# Patient Record
Sex: FEMALE | Race: BLACK OR AFRICAN AMERICAN | NOT HISPANIC OR LATINO
[De-identification: names, ages, dates, MRNs, and addresses within clinical notes are randomized per-mention and may not be internally consistent; named-entity substitution may affect disease eponyms.]

---

## 2017-10-09 ENCOUNTER — RESULT REVIEW (OUTPATIENT)
Age: 32
End: 2017-10-09

## 2017-12-09 ENCOUNTER — TRANSCRIPTION ENCOUNTER (OUTPATIENT)
Age: 32
End: 2017-12-09

## 2017-12-09 ENCOUNTER — OUTPATIENT (OUTPATIENT)
Dept: OUTPATIENT SERVICES | Facility: HOSPITAL | Age: 32
LOS: 1 days | End: 2017-12-09
Payer: COMMERCIAL

## 2017-12-09 DIAGNOSIS — N88.3 INCOMPETENCE OF CERVIX UTERI: ICD-10-CM

## 2017-12-09 LAB
BASOPHILS # BLD AUTO: 0.1 K/UL — SIGNIFICANT CHANGE UP (ref 0–0.2)
BASOPHILS NFR BLD AUTO: 0.6 % — SIGNIFICANT CHANGE UP (ref 0–2)
BLD GP AB SCN SERPL QL: NEGATIVE — SIGNIFICANT CHANGE UP
EOSINOPHIL # BLD AUTO: 0.1 K/UL — SIGNIFICANT CHANGE UP (ref 0–0.5)
EOSINOPHIL NFR BLD AUTO: 0.8 % — SIGNIFICANT CHANGE UP (ref 0–6)
HCT VFR BLD CALC: 34.1 % — LOW (ref 34.5–45)
HGB BLD-MCNC: 11.7 G/DL — SIGNIFICANT CHANGE UP (ref 11.5–15.5)
LYMPHOCYTES # BLD AUTO: 1.9 K/UL — SIGNIFICANT CHANGE UP (ref 1–3.3)
LYMPHOCYTES # BLD AUTO: 20.7 % — SIGNIFICANT CHANGE UP (ref 13–44)
MCHC RBC-ENTMCNC: 28.5 PG — SIGNIFICANT CHANGE UP (ref 27–34)
MCHC RBC-ENTMCNC: 34.4 GM/DL — SIGNIFICANT CHANGE UP (ref 32–36)
MCV RBC AUTO: 82.9 FL — SIGNIFICANT CHANGE UP (ref 80–100)
MONOCYTES # BLD AUTO: 0.6 K/UL — SIGNIFICANT CHANGE UP (ref 0–0.9)
MONOCYTES NFR BLD AUTO: 6 % — SIGNIFICANT CHANGE UP (ref 2–14)
NEUTROPHILS # BLD AUTO: 6.7 K/UL — SIGNIFICANT CHANGE UP (ref 1.8–7.4)
NEUTROPHILS NFR BLD AUTO: 71.9 % — SIGNIFICANT CHANGE UP (ref 43–77)
PLATELET # BLD AUTO: 287 K/UL — SIGNIFICANT CHANGE UP (ref 150–400)
RBC # BLD: 4.12 M/UL — SIGNIFICANT CHANGE UP (ref 3.8–5.2)
RBC # FLD: 11.9 % — SIGNIFICANT CHANGE UP (ref 10.3–14.5)
RH IG SCN BLD-IMP: POSITIVE — SIGNIFICANT CHANGE UP
RH IG SCN BLD-IMP: POSITIVE — SIGNIFICANT CHANGE UP
T PALLIDUM AB TITR SER: NEGATIVE — SIGNIFICANT CHANGE UP
WBC # BLD: 9.3 K/UL — SIGNIFICANT CHANGE UP (ref 3.8–10.5)
WBC # FLD AUTO: 9.3 K/UL — SIGNIFICANT CHANGE UP (ref 3.8–10.5)

## 2017-12-09 PROCEDURE — 86900 BLOOD TYPING SEROLOGIC ABO: CPT

## 2017-12-09 PROCEDURE — 86780 TREPONEMA PALLIDUM: CPT

## 2017-12-09 PROCEDURE — 59320 REVISION OF CERVIX: CPT

## 2017-12-09 PROCEDURE — 86901 BLOOD TYPING SEROLOGIC RH(D): CPT

## 2017-12-09 PROCEDURE — 86850 RBC ANTIBODY SCREEN: CPT

## 2017-12-09 PROCEDURE — 85027 COMPLETE CBC AUTOMATED: CPT

## 2017-12-09 RX ORDER — INDOMETHACIN 50 MG
50 CAPSULE ORAL ONCE
Qty: 0 | Refills: 0 | Status: COMPLETED | OUTPATIENT
Start: 2017-12-09 | End: 2017-12-09

## 2017-12-09 RX ORDER — CITRIC ACID/SODIUM CITRATE 300-500 MG
15 SOLUTION, ORAL ORAL ONCE
Qty: 0 | Refills: 0 | Status: COMPLETED | OUTPATIENT
Start: 2017-12-09 | End: 2017-12-09

## 2017-12-09 RX ORDER — SODIUM CHLORIDE 9 MG/ML
1000 INJECTION, SOLUTION INTRAVENOUS
Qty: 0 | Refills: 0 | Status: DISCONTINUED | OUTPATIENT
Start: 2017-12-09 | End: 2017-12-24

## 2017-12-09 RX ORDER — FAMOTIDINE 10 MG/ML
20 INJECTION INTRAVENOUS ONCE
Qty: 0 | Refills: 0 | Status: COMPLETED | OUTPATIENT
Start: 2017-12-09 | End: 2017-12-09

## 2017-12-09 RX ORDER — INDOMETHACIN 50 MG
25 CAPSULE ORAL ONCE
Qty: 0 | Refills: 0 | Status: DISCONTINUED | OUTPATIENT
Start: 2017-12-09 | End: 2017-12-09

## 2017-12-09 RX ORDER — ACETAMINOPHEN 500 MG
975 TABLET ORAL ONCE
Qty: 0 | Refills: 0 | Status: COMPLETED | OUTPATIENT
Start: 2017-12-09 | End: 2017-12-09

## 2017-12-09 RX ADMIN — FAMOTIDINE 20 MILLIGRAM(S): 10 INJECTION INTRAVENOUS at 09:40

## 2017-12-09 RX ADMIN — Medication 975 MILLIGRAM(S): at 14:30

## 2017-12-09 RX ADMIN — Medication 50 MILLIGRAM(S): at 16:11

## 2017-12-09 RX ADMIN — SODIUM CHLORIDE 125 MILLILITER(S): 9 INJECTION, SOLUTION INTRAVENOUS at 09:13

## 2017-12-09 RX ADMIN — Medication 50 MILLIGRAM(S): at 10:00

## 2017-12-09 RX ADMIN — Medication 975 MILLIGRAM(S): at 14:00

## 2017-12-09 RX ADMIN — Medication 15 MILLILITER(S): at 09:40

## 2018-01-17 ENCOUNTER — APPOINTMENT (OUTPATIENT)
Dept: ANTEPARTUM | Facility: CLINIC | Age: 33
End: 2018-01-17
Payer: COMMERCIAL

## 2018-01-17 ENCOUNTER — ASOB RESULT (OUTPATIENT)
Age: 33
End: 2018-01-17

## 2018-01-17 PROBLEM — Z00.00 ENCOUNTER FOR PREVENTIVE HEALTH EXAMINATION: Status: ACTIVE | Noted: 2018-01-17

## 2018-01-17 PROCEDURE — 76817 TRANSVAGINAL US OBSTETRIC: CPT

## 2018-01-17 PROCEDURE — 76811 OB US DETAILED SNGL FETUS: CPT

## 2018-01-28 ENCOUNTER — OUTPATIENT (OUTPATIENT)
Dept: OUTPATIENT SERVICES | Facility: HOSPITAL | Age: 33
LOS: 1 days | End: 2018-01-28
Payer: COMMERCIAL

## 2018-01-28 DIAGNOSIS — O26.899 OTHER SPECIFIED PREGNANCY RELATED CONDITIONS, UNSPECIFIED TRIMESTER: ICD-10-CM

## 2018-01-28 DIAGNOSIS — Z3A.00 WEEKS OF GESTATION OF PREGNANCY NOT SPECIFIED: ICD-10-CM

## 2018-01-28 LAB
APPEARANCE UR: ABNORMAL
BILIRUB UR-MCNC: NEGATIVE — SIGNIFICANT CHANGE UP
COLOR SPEC: SIGNIFICANT CHANGE UP
DIFF PNL FLD: NEGATIVE — SIGNIFICANT CHANGE UP
EPI CELLS # UR: ABNORMAL /HPF
GLUCOSE UR QL: NEGATIVE — SIGNIFICANT CHANGE UP
KETONES UR-MCNC: NEGATIVE — SIGNIFICANT CHANGE UP
LEUKOCYTE ESTERASE UR-ACNC: ABNORMAL
NITRITE UR-MCNC: NEGATIVE — SIGNIFICANT CHANGE UP
PH UR: 7 — SIGNIFICANT CHANGE UP (ref 5–8)
PROT UR-MCNC: NEGATIVE — SIGNIFICANT CHANGE UP
RBC CASTS # UR COMP ASSIST: SIGNIFICANT CHANGE UP /HPF (ref 0–2)
SP GR SPEC: 1.01 — SIGNIFICANT CHANGE UP (ref 1.01–1.02)
UROBILINOGEN FLD QL: NEGATIVE — SIGNIFICANT CHANGE UP
WBC UR QL: SIGNIFICANT CHANGE UP /HPF (ref 0–5)

## 2018-01-28 PROCEDURE — 87086 URINE CULTURE/COLONY COUNT: CPT

## 2018-01-28 PROCEDURE — G0463: CPT

## 2018-01-28 PROCEDURE — 81001 URINALYSIS AUTO W/SCOPE: CPT

## 2018-01-28 RX ORDER — NITROFURANTOIN MACROCRYSTAL 50 MG
1 CAPSULE ORAL
Qty: 20 | Refills: 0
Start: 2018-01-28 | End: 2018-02-06

## 2018-01-28 RX ORDER — NITROFURANTOIN MACROCRYSTAL 50 MG
100 CAPSULE ORAL
Qty: 0 | Refills: 0 | Status: DISCONTINUED | OUTPATIENT
Start: 2018-01-28 | End: 2018-02-12

## 2018-01-28 RX ADMIN — Medication 100 MILLIGRAM(S): at 09:45

## 2018-01-29 LAB
CULTURE RESULTS: SIGNIFICANT CHANGE UP
SPECIMEN SOURCE: SIGNIFICANT CHANGE UP

## 2018-02-01 ENCOUNTER — OUTPATIENT (OUTPATIENT)
Dept: OUTPATIENT SERVICES | Facility: HOSPITAL | Age: 33
LOS: 1 days | End: 2018-02-01
Payer: COMMERCIAL

## 2018-02-01 ENCOUNTER — APPOINTMENT (OUTPATIENT)
Dept: ANTEPARTUM | Facility: CLINIC | Age: 33
End: 2018-02-01
Payer: COMMERCIAL

## 2018-02-01 DIAGNOSIS — Z3A.00 WEEKS OF GESTATION OF PREGNANCY NOT SPECIFIED: ICD-10-CM

## 2018-02-01 DIAGNOSIS — O26.899 OTHER SPECIFIED PREGNANCY RELATED CONDITIONS, UNSPECIFIED TRIMESTER: ICD-10-CM

## 2018-02-01 PROCEDURE — 76815 OB US LIMITED FETUS(S): CPT

## 2018-02-01 PROCEDURE — G0463: CPT

## 2018-02-01 PROCEDURE — 59025 FETAL NON-STRESS TEST: CPT

## 2018-02-01 PROCEDURE — 76817 TRANSVAGINAL US OBSTETRIC: CPT

## 2018-02-01 RX ORDER — PROGESTERONE 200 MG/1
200 CAPSULE, LIQUID FILLED ORAL ONCE
Qty: 0 | Refills: 0 | Status: COMPLETED | OUTPATIENT
Start: 2018-02-01 | End: 2018-02-01

## 2018-02-01 RX ORDER — INDOMETHACIN 50 MG
50 CAPSULE ORAL ONCE
Qty: 0 | Refills: 0 | Status: COMPLETED | OUTPATIENT
Start: 2018-02-01 | End: 2018-02-01

## 2018-02-01 RX ADMIN — PROGESTERONE 200 MILLIGRAM(S): 200 CAPSULE, LIQUID FILLED ORAL at 21:10

## 2018-02-01 RX ADMIN — Medication 50 MILLIGRAM(S): at 20:10

## 2018-02-08 ENCOUNTER — APPOINTMENT (OUTPATIENT)
Dept: ANTEPARTUM | Facility: CLINIC | Age: 33
End: 2018-02-08
Payer: COMMERCIAL

## 2018-02-08 ENCOUNTER — ASOB RESULT (OUTPATIENT)
Age: 33
End: 2018-02-08

## 2018-02-08 PROCEDURE — 76817 TRANSVAGINAL US OBSTETRIC: CPT

## 2018-02-08 PROCEDURE — 99213 OFFICE O/P EST LOW 20 MIN: CPT | Mod: 25

## 2018-02-08 PROCEDURE — 76815 OB US LIMITED FETUS(S): CPT

## 2018-02-22 ENCOUNTER — APPOINTMENT (OUTPATIENT)
Dept: ANTEPARTUM | Facility: CLINIC | Age: 33
End: 2018-02-22
Payer: COMMERCIAL

## 2018-02-22 ENCOUNTER — ASOB RESULT (OUTPATIENT)
Age: 33
End: 2018-02-22

## 2018-02-22 PROCEDURE — 76816 OB US FOLLOW-UP PER FETUS: CPT

## 2018-02-22 PROCEDURE — 76817 TRANSVAGINAL US OBSTETRIC: CPT

## 2018-03-07 ENCOUNTER — ASOB RESULT (OUTPATIENT)
Age: 33
End: 2018-03-07

## 2018-03-07 ENCOUNTER — APPOINTMENT (OUTPATIENT)
Dept: ANTEPARTUM | Facility: CLINIC | Age: 33
End: 2018-03-07
Payer: COMMERCIAL

## 2018-03-07 PROCEDURE — 76815 OB US LIMITED FETUS(S): CPT

## 2018-03-07 PROCEDURE — 76817 TRANSVAGINAL US OBSTETRIC: CPT

## 2018-03-19 ENCOUNTER — APPOINTMENT (OUTPATIENT)
Dept: ANTEPARTUM | Facility: CLINIC | Age: 33
End: 2018-03-19
Payer: COMMERCIAL

## 2018-03-19 ENCOUNTER — ASOB RESULT (OUTPATIENT)
Age: 33
End: 2018-03-19

## 2018-03-19 ENCOUNTER — OUTPATIENT (OUTPATIENT)
Dept: OUTPATIENT SERVICES | Facility: HOSPITAL | Age: 33
LOS: 1 days | End: 2018-03-19
Payer: COMMERCIAL

## 2018-03-19 DIAGNOSIS — O26.899 OTHER SPECIFIED PREGNANCY RELATED CONDITIONS, UNSPECIFIED TRIMESTER: ICD-10-CM

## 2018-03-19 DIAGNOSIS — Z3A.00 WEEKS OF GESTATION OF PREGNANCY NOT SPECIFIED: ICD-10-CM

## 2018-03-19 LAB
APPEARANCE UR: CLEAR — SIGNIFICANT CHANGE UP
BILIRUB UR-MCNC: NEGATIVE — SIGNIFICANT CHANGE UP
COLOR SPEC: COLORLESS — SIGNIFICANT CHANGE UP
DIFF PNL FLD: NEGATIVE — SIGNIFICANT CHANGE UP
EPI CELLS # UR: SIGNIFICANT CHANGE UP /HPF
GLUCOSE UR QL: NEGATIVE — SIGNIFICANT CHANGE UP
KETONES UR-MCNC: NEGATIVE — SIGNIFICANT CHANGE UP
LEUKOCYTE ESTERASE UR-ACNC: NEGATIVE — SIGNIFICANT CHANGE UP
NITRITE UR-MCNC: NEGATIVE — SIGNIFICANT CHANGE UP
PH UR: 6 — SIGNIFICANT CHANGE UP (ref 5–8)
PROT UR-MCNC: NEGATIVE — SIGNIFICANT CHANGE UP
RBC CASTS # UR COMP ASSIST: SIGNIFICANT CHANGE UP /HPF (ref 0–2)
SP GR SPEC: 1 — LOW (ref 1.01–1.02)
UROBILINOGEN FLD QL: NEGATIVE — SIGNIFICANT CHANGE UP
WBC UR QL: SIGNIFICANT CHANGE UP /HPF (ref 0–5)

## 2018-03-19 PROCEDURE — 76817 TRANSVAGINAL US OBSTETRIC: CPT

## 2018-03-19 PROCEDURE — 81001 URINALYSIS AUTO W/SCOPE: CPT

## 2018-03-19 PROCEDURE — 76816 OB US FOLLOW-UP PER FETUS: CPT | Mod: 26

## 2018-03-19 PROCEDURE — 76817 TRANSVAGINAL US OBSTETRIC: CPT | Mod: 26

## 2018-03-19 PROCEDURE — 59025 FETAL NON-STRESS TEST: CPT

## 2018-03-19 PROCEDURE — 76819 FETAL BIOPHYS PROFIL W/O NST: CPT | Mod: 26

## 2018-03-19 PROCEDURE — G0463: CPT

## 2018-03-19 PROCEDURE — 76819 FETAL BIOPHYS PROFIL W/O NST: CPT

## 2018-03-19 PROCEDURE — 76816 OB US FOLLOW-UP PER FETUS: CPT

## 2018-03-19 PROCEDURE — 87086 URINE CULTURE/COLONY COUNT: CPT

## 2018-03-19 RX ADMIN — Medication 12 MILLIGRAM(S): at 18:05

## 2018-03-20 ENCOUNTER — OUTPATIENT (OUTPATIENT)
Dept: OUTPATIENT SERVICES | Facility: HOSPITAL | Age: 33
LOS: 1 days | End: 2018-03-20
Payer: COMMERCIAL

## 2018-03-20 DIAGNOSIS — O26.899 OTHER SPECIFIED PREGNANCY RELATED CONDITIONS, UNSPECIFIED TRIMESTER: ICD-10-CM

## 2018-03-20 DIAGNOSIS — Z3A.00 WEEKS OF GESTATION OF PREGNANCY NOT SPECIFIED: ICD-10-CM

## 2018-03-20 LAB
CULTURE RESULTS: SIGNIFICANT CHANGE UP
SPECIMEN SOURCE: SIGNIFICANT CHANGE UP

## 2018-03-20 PROCEDURE — 59025 FETAL NON-STRESS TEST: CPT

## 2018-03-20 PROCEDURE — G0463: CPT

## 2018-03-20 RX ADMIN — Medication 12 MILLIGRAM(S): at 19:24

## 2018-03-21 ENCOUNTER — APPOINTMENT (OUTPATIENT)
Dept: ANTEPARTUM | Facility: CLINIC | Age: 33
End: 2018-03-21

## 2018-05-15 ENCOUNTER — TRANSCRIPTION ENCOUNTER (OUTPATIENT)
Age: 33
End: 2018-05-15

## 2018-05-16 ENCOUNTER — OUTPATIENT (OUTPATIENT)
Dept: OUTPATIENT SERVICES | Facility: HOSPITAL | Age: 33
LOS: 1 days | End: 2018-05-16
Payer: COMMERCIAL

## 2018-05-16 DIAGNOSIS — O34.31 MATERNAL CARE FOR CERVICAL INCOMPETENCE, FIRST TRIMESTER: ICD-10-CM

## 2018-05-16 DIAGNOSIS — O26.899 OTHER SPECIFIED PREGNANCY RELATED CONDITIONS, UNSPECIFIED TRIMESTER: ICD-10-CM

## 2018-05-16 DIAGNOSIS — Z3A.00 WEEKS OF GESTATION OF PREGNANCY NOT SPECIFIED: ICD-10-CM

## 2018-05-16 LAB
BASOPHILS # BLD AUTO: 0 K/UL — SIGNIFICANT CHANGE UP (ref 0–0.2)
BASOPHILS NFR BLD AUTO: 0.2 % — SIGNIFICANT CHANGE UP (ref 0–2)
BLD GP AB SCN SERPL QL: NEGATIVE — SIGNIFICANT CHANGE UP
EOSINOPHIL # BLD AUTO: 0.1 K/UL — SIGNIFICANT CHANGE UP (ref 0–0.5)
EOSINOPHIL NFR BLD AUTO: 0.6 % — SIGNIFICANT CHANGE UP (ref 0–6)
HCT VFR BLD CALC: 36.6 % — SIGNIFICANT CHANGE UP (ref 34.5–45)
HGB BLD-MCNC: 12.2 G/DL — SIGNIFICANT CHANGE UP (ref 11.5–15.5)
LYMPHOCYTES # BLD AUTO: 1.9 K/UL — SIGNIFICANT CHANGE UP (ref 1–3.3)
LYMPHOCYTES # BLD AUTO: 15.7 % — SIGNIFICANT CHANGE UP (ref 13–44)
MCHC RBC-ENTMCNC: 27.2 PG — SIGNIFICANT CHANGE UP (ref 27–34)
MCHC RBC-ENTMCNC: 33.2 GM/DL — SIGNIFICANT CHANGE UP (ref 32–36)
MCV RBC AUTO: 82 FL — SIGNIFICANT CHANGE UP (ref 80–100)
MONOCYTES # BLD AUTO: 0.9 K/UL — SIGNIFICANT CHANGE UP (ref 0–0.9)
MONOCYTES NFR BLD AUTO: 7.7 % — SIGNIFICANT CHANGE UP (ref 2–14)
NEUTROPHILS # BLD AUTO: 9.1 K/UL — HIGH (ref 1.8–7.4)
NEUTROPHILS NFR BLD AUTO: 75.7 % — SIGNIFICANT CHANGE UP (ref 43–77)
PLATELET # BLD AUTO: 274 K/UL — SIGNIFICANT CHANGE UP (ref 150–400)
RBC # BLD: 4.47 M/UL — SIGNIFICANT CHANGE UP (ref 3.8–5.2)
RBC # FLD: 14.9 % — HIGH (ref 10.3–14.5)
RH IG SCN BLD-IMP: POSITIVE — SIGNIFICANT CHANGE UP
WBC # BLD: 12 K/UL — HIGH (ref 3.8–10.5)
WBC # FLD AUTO: 12 K/UL — HIGH (ref 3.8–10.5)

## 2018-05-16 PROCEDURE — 59871 REMOVE CERCLAGE SUTURE: CPT

## 2018-05-16 PROCEDURE — 86900 BLOOD TYPING SEROLOGIC ABO: CPT

## 2018-05-16 PROCEDURE — 86850 RBC ANTIBODY SCREEN: CPT

## 2018-05-16 PROCEDURE — 59025 FETAL NON-STRESS TEST: CPT

## 2018-05-16 PROCEDURE — 85027 COMPLETE CBC AUTOMATED: CPT

## 2018-05-16 PROCEDURE — G0463: CPT

## 2018-05-16 PROCEDURE — 86901 BLOOD TYPING SEROLOGIC RH(D): CPT

## 2018-05-16 RX ORDER — SODIUM CHLORIDE 9 MG/ML
500 INJECTION, SOLUTION INTRAVENOUS ONCE
Qty: 0 | Refills: 0 | Status: DISCONTINUED | OUTPATIENT
Start: 2018-05-16 | End: 2018-05-31

## 2018-05-16 RX ORDER — SODIUM CHLORIDE 9 MG/ML
1000 INJECTION, SOLUTION INTRAVENOUS
Qty: 0 | Refills: 0 | Status: DISCONTINUED | OUTPATIENT
Start: 2018-05-16 | End: 2018-05-31

## 2018-05-25 ENCOUNTER — INPATIENT (INPATIENT)
Facility: HOSPITAL | Age: 33
LOS: 1 days | Discharge: HOME | End: 2018-05-27
Attending: OBSTETRICS & GYNECOLOGY | Admitting: OBSTETRICS & GYNECOLOGY
Payer: COMMERCIAL

## 2018-05-25 DIAGNOSIS — Z3A.00 WEEKS OF GESTATION OF PREGNANCY NOT SPECIFIED: ICD-10-CM

## 2018-05-25 DIAGNOSIS — O26.899 OTHER SPECIFIED PREGNANCY RELATED CONDITIONS, UNSPECIFIED TRIMESTER: ICD-10-CM

## 2018-05-25 DIAGNOSIS — Z34.80 ENCOUNTER FOR SUPERVISION OF OTHER NORMAL PREGNANCY, UNSPECIFIED TRIMESTER: ICD-10-CM

## 2018-05-25 LAB
ALBUMIN SERPL ELPH-MCNC: 3.7 G/DL — SIGNIFICANT CHANGE UP (ref 3.3–5)
ALP SERPL-CCNC: 93 U/L — SIGNIFICANT CHANGE UP (ref 40–120)
ALT FLD-CCNC: 20 U/L — SIGNIFICANT CHANGE UP (ref 10–45)
ANION GAP SERPL CALC-SCNC: 14 MMOL/L — SIGNIFICANT CHANGE UP (ref 5–17)
APTT BLD: 28.8 SEC — SIGNIFICANT CHANGE UP (ref 27.5–37.4)
AST SERPL-CCNC: 22 U/L — SIGNIFICANT CHANGE UP (ref 10–40)
BASOPHILS # BLD AUTO: 0 K/UL — SIGNIFICANT CHANGE UP (ref 0–0.2)
BASOPHILS NFR BLD AUTO: 0.4 % — SIGNIFICANT CHANGE UP (ref 0–2)
BILIRUB SERPL-MCNC: 0.2 MG/DL — SIGNIFICANT CHANGE UP (ref 0.2–1.2)
BUN SERPL-MCNC: 13 MG/DL — SIGNIFICANT CHANGE UP (ref 7–23)
CALCIUM SERPL-MCNC: 9.8 MG/DL — SIGNIFICANT CHANGE UP (ref 8.4–10.5)
CHLORIDE SERPL-SCNC: 99 MMOL/L — SIGNIFICANT CHANGE UP (ref 96–108)
CO2 SERPL-SCNC: 23 MMOL/L — SIGNIFICANT CHANGE UP (ref 22–31)
CREAT SERPL-MCNC: 0.95 MG/DL — SIGNIFICANT CHANGE UP (ref 0.5–1.3)
EOSINOPHIL # BLD AUTO: 0.1 K/UL — SIGNIFICANT CHANGE UP (ref 0–0.5)
EOSINOPHIL NFR BLD AUTO: 0.9 % — SIGNIFICANT CHANGE UP (ref 0–6)
FIBRINOGEN PPP-MCNC: 882 MG/DL — HIGH (ref 310–510)
GLUCOSE SERPL-MCNC: 85 MG/DL — SIGNIFICANT CHANGE UP (ref 70–99)
HCT VFR BLD CALC: 38.6 % — SIGNIFICANT CHANGE UP (ref 34.5–45)
HGB BLD-MCNC: 12.1 G/DL — SIGNIFICANT CHANGE UP (ref 11.5–15.5)
INR BLD: 0.97 RATIO — SIGNIFICANT CHANGE UP (ref 0.88–1.16)
LDH SERPL L TO P-CCNC: 174 U/L — SIGNIFICANT CHANGE UP (ref 50–242)
LYMPHOCYTES # BLD AUTO: 19.2 % — SIGNIFICANT CHANGE UP (ref 13–44)
LYMPHOCYTES # BLD AUTO: 2.3 K/UL — SIGNIFICANT CHANGE UP (ref 1–3.3)
MCHC RBC-ENTMCNC: 25.5 PG — LOW (ref 27–34)
MCHC RBC-ENTMCNC: 31.3 GM/DL — LOW (ref 32–36)
MCV RBC AUTO: 81.4 FL — SIGNIFICANT CHANGE UP (ref 80–100)
MONOCYTES # BLD AUTO: 1 K/UL — HIGH (ref 0–0.9)
MONOCYTES NFR BLD AUTO: 8.2 % — SIGNIFICANT CHANGE UP (ref 2–14)
NEUTROPHILS # BLD AUTO: 8.7 K/UL — HIGH (ref 1.8–7.4)
NEUTROPHILS NFR BLD AUTO: 71.3 % — SIGNIFICANT CHANGE UP (ref 43–77)
PLATELET # BLD AUTO: 301 K/UL — SIGNIFICANT CHANGE UP (ref 150–400)
POTASSIUM SERPL-MCNC: 3.8 MMOL/L — SIGNIFICANT CHANGE UP (ref 3.5–5.3)
POTASSIUM SERPL-SCNC: 3.8 MMOL/L — SIGNIFICANT CHANGE UP (ref 3.5–5.3)
PROT SERPL-MCNC: 7.4 G/DL — SIGNIFICANT CHANGE UP (ref 6–8.3)
PROTHROM AB SERPL-ACNC: 10.5 SEC — SIGNIFICANT CHANGE UP (ref 9.8–12.7)
RBC # BLD: 4.75 M/UL — SIGNIFICANT CHANGE UP (ref 3.8–5.2)
RBC # FLD: 15.4 % — HIGH (ref 10.3–14.5)
SODIUM SERPL-SCNC: 136 MMOL/L — SIGNIFICANT CHANGE UP (ref 135–145)
URATE SERPL-MCNC: 5.1 MG/DL — SIGNIFICANT CHANGE UP (ref 2.5–7)
WBC # BLD: 12.2 K/UL — HIGH (ref 3.8–10.5)
WBC # FLD AUTO: 12.2 K/UL — HIGH (ref 3.8–10.5)

## 2018-05-25 RX ORDER — CITRIC ACID/SODIUM CITRATE 300-500 MG
15 SOLUTION, ORAL ORAL EVERY 4 HOURS
Qty: 0 | Refills: 0 | Status: DISCONTINUED | OUTPATIENT
Start: 2018-05-25 | End: 2018-05-26

## 2018-05-25 RX ORDER — AMPICILLIN TRIHYDRATE 250 MG
1 CAPSULE ORAL EVERY 4 HOURS
Qty: 0 | Refills: 0 | Status: DISCONTINUED | OUTPATIENT
Start: 2018-05-26 | End: 2018-05-27

## 2018-05-25 RX ORDER — SODIUM CHLORIDE 9 MG/ML
1000 INJECTION, SOLUTION INTRAVENOUS ONCE
Qty: 0 | Refills: 0 | Status: COMPLETED | OUTPATIENT
Start: 2018-05-25 | End: 2018-05-25

## 2018-05-25 RX ORDER — OXYTOCIN 10 UNIT/ML
333.33 VIAL (ML) INJECTION
Qty: 20 | Refills: 0 | Status: DISCONTINUED | OUTPATIENT
Start: 2018-05-25 | End: 2018-05-26

## 2018-05-25 RX ORDER — AMPICILLIN TRIHYDRATE 250 MG
2 CAPSULE ORAL ONCE
Qty: 0 | Refills: 0 | Status: COMPLETED | OUTPATIENT
Start: 2018-05-25 | End: 2018-05-25

## 2018-05-25 RX ORDER — AMPICILLIN TRIHYDRATE 250 MG
CAPSULE ORAL
Qty: 0 | Refills: 0 | Status: DISCONTINUED | OUTPATIENT
Start: 2018-05-25 | End: 2018-05-27

## 2018-05-25 RX ORDER — SODIUM CHLORIDE 9 MG/ML
1000 INJECTION, SOLUTION INTRAVENOUS
Qty: 0 | Refills: 0 | Status: DISCONTINUED | OUTPATIENT
Start: 2018-05-25 | End: 2018-05-26

## 2018-05-25 RX ADMIN — Medication 216 GRAM(S): at 22:27

## 2018-05-25 RX ADMIN — SODIUM CHLORIDE 2000 MILLILITER(S): 9 INJECTION, SOLUTION INTRAVENOUS at 22:27

## 2018-05-25 RX ADMIN — SODIUM CHLORIDE 250 MILLILITER(S): 9 INJECTION, SOLUTION INTRAVENOUS at 22:27

## 2018-05-26 VITALS
OXYGEN SATURATION: 100 % | TEMPERATURE: 98 F | RESPIRATION RATE: 20 BRPM | SYSTOLIC BLOOD PRESSURE: 145 MMHG | HEART RATE: 113 BPM | DIASTOLIC BLOOD PRESSURE: 86 MMHG

## 2018-05-26 LAB
APPEARANCE UR: ABNORMAL
BILIRUB UR-MCNC: NEGATIVE — SIGNIFICANT CHANGE UP
BLD GP AB SCN SERPL QL: NEGATIVE — SIGNIFICANT CHANGE UP
COLOR SPEC: YELLOW — SIGNIFICANT CHANGE UP
COMMENT - URINE: SIGNIFICANT CHANGE UP
CREAT ?TM UR-MCNC: 69 MG/DL — SIGNIFICANT CHANGE UP
DIFF PNL FLD: ABNORMAL
EPI CELLS # UR: SIGNIFICANT CHANGE UP /HPF
GLUCOSE UR QL: 250 MG/DL
HCT VFR BLD CALC: 34.4 % — LOW (ref 34.5–45)
HGB BLD-MCNC: 11.2 G/DL — LOW (ref 11.5–15.5)
KETONES UR-MCNC: ABNORMAL
LEUKOCYTE ESTERASE UR-ACNC: ABNORMAL
NITRITE UR-MCNC: NEGATIVE — SIGNIFICANT CHANGE UP
PH UR: 6 — SIGNIFICANT CHANGE UP (ref 5–8)
PROT ?TM UR-MCNC: 50 MG/DL — HIGH (ref 0–12)
PROT UR-MCNC: 30 MG/DL
PROT/CREAT UR-RTO: 0.7 RATIO — HIGH (ref 0–0.2)
RBC CASTS # UR COMP ASSIST: >50 /HPF (ref 0–2)
RH IG SCN BLD-IMP: POSITIVE — SIGNIFICANT CHANGE UP
SP GR SPEC: 1.01 — SIGNIFICANT CHANGE UP (ref 1.01–1.02)
T PALLIDUM AB TITR SER: NEGATIVE — SIGNIFICANT CHANGE UP
UROBILINOGEN FLD QL: NEGATIVE — SIGNIFICANT CHANGE UP
WBC UR QL: ABNORMAL /HPF (ref 0–5)

## 2018-05-26 RX ORDER — ACETAMINOPHEN 500 MG
975 TABLET ORAL EVERY 6 HOURS
Qty: 0 | Refills: 0 | Status: DISCONTINUED | OUTPATIENT
Start: 2018-05-26 | End: 2018-05-27

## 2018-05-26 RX ORDER — AER TRAVELER 0.5 G/1
1 SOLUTION RECTAL; TOPICAL EVERY 4 HOURS
Qty: 0 | Refills: 0 | Status: DISCONTINUED | OUTPATIENT
Start: 2018-05-26 | End: 2018-05-27

## 2018-05-26 RX ORDER — GLYCERIN ADULT
1 SUPPOSITORY, RECTAL RECTAL AT BEDTIME
Qty: 0 | Refills: 0 | Status: DISCONTINUED | OUTPATIENT
Start: 2018-05-26 | End: 2018-05-27

## 2018-05-26 RX ORDER — TETANUS TOXOID, REDUCED DIPHTHERIA TOXOID AND ACELLULAR PERTUSSIS VACCINE, ADSORBED 5; 2.5; 8; 8; 2.5 [IU]/.5ML; [IU]/.5ML; UG/.5ML; UG/.5ML; UG/.5ML
0.5 SUSPENSION INTRAMUSCULAR ONCE
Qty: 0 | Refills: 0 | Status: DISCONTINUED | OUTPATIENT
Start: 2018-05-26 | End: 2018-05-27

## 2018-05-26 RX ORDER — INFLUENZA VIRUS VACCINE 15; 15; 15; 15 UG/.5ML; UG/.5ML; UG/.5ML; UG/.5ML
0.5 SUSPENSION INTRAMUSCULAR ONCE
Qty: 0 | Refills: 0 | Status: DISCONTINUED | OUTPATIENT
Start: 2018-05-26 | End: 2018-05-27

## 2018-05-26 RX ORDER — DIPHENHYDRAMINE HCL 50 MG
25 CAPSULE ORAL EVERY 6 HOURS
Qty: 0 | Refills: 0 | Status: DISCONTINUED | OUTPATIENT
Start: 2018-05-26 | End: 2018-05-27

## 2018-05-26 RX ORDER — HYDROCORTISONE 1 %
1 OINTMENT (GRAM) TOPICAL EVERY 4 HOURS
Qty: 0 | Refills: 0 | Status: DISCONTINUED | OUTPATIENT
Start: 2018-05-26 | End: 2018-05-27

## 2018-05-26 RX ORDER — KETOROLAC TROMETHAMINE 30 MG/ML
30 SYRINGE (ML) INJECTION ONCE
Qty: 0 | Refills: 0 | Status: DISCONTINUED | OUTPATIENT
Start: 2018-05-26 | End: 2018-05-26

## 2018-05-26 RX ORDER — ACETAMINOPHEN 500 MG
975 TABLET ORAL EVERY 6 HOURS
Qty: 0 | Refills: 0 | Status: COMPLETED | OUTPATIENT
Start: 2018-05-26 | End: 2019-04-24

## 2018-05-26 RX ORDER — OXYTOCIN 10 UNIT/ML
41.67 VIAL (ML) INJECTION
Qty: 20 | Refills: 0 | Status: DISCONTINUED | OUTPATIENT
Start: 2018-05-26 | End: 2018-05-27

## 2018-05-26 RX ORDER — IBUPROFEN 200 MG
600 TABLET ORAL EVERY 6 HOURS
Qty: 0 | Refills: 0 | Status: COMPLETED | OUTPATIENT
Start: 2018-05-26 | End: 2019-04-24

## 2018-05-26 RX ORDER — DIBUCAINE 1 %
1 OINTMENT (GRAM) RECTAL EVERY 4 HOURS
Qty: 0 | Refills: 0 | Status: DISCONTINUED | OUTPATIENT
Start: 2018-05-26 | End: 2018-05-27

## 2018-05-26 RX ORDER — PRAMOXINE HYDROCHLORIDE 150 MG/15G
1 AEROSOL, FOAM RECTAL EVERY 4 HOURS
Qty: 0 | Refills: 0 | Status: DISCONTINUED | OUTPATIENT
Start: 2018-05-26 | End: 2018-05-27

## 2018-05-26 RX ORDER — DOCUSATE SODIUM 100 MG
100 CAPSULE ORAL
Qty: 0 | Refills: 0 | Status: DISCONTINUED | OUTPATIENT
Start: 2018-05-26 | End: 2018-05-27

## 2018-05-26 RX ORDER — SIMETHICONE 80 MG/1
80 TABLET, CHEWABLE ORAL EVERY 6 HOURS
Qty: 0 | Refills: 0 | Status: DISCONTINUED | OUTPATIENT
Start: 2018-05-26 | End: 2018-05-27

## 2018-05-26 RX ORDER — SODIUM CHLORIDE 9 MG/ML
3 INJECTION INTRAMUSCULAR; INTRAVENOUS; SUBCUTANEOUS EVERY 8 HOURS
Qty: 0 | Refills: 0 | Status: DISCONTINUED | OUTPATIENT
Start: 2018-05-26 | End: 2018-05-27

## 2018-05-26 RX ORDER — OXYCODONE HYDROCHLORIDE 5 MG/1
5 TABLET ORAL
Qty: 0 | Refills: 0 | Status: DISCONTINUED | OUTPATIENT
Start: 2018-05-26 | End: 2018-05-27

## 2018-05-26 RX ORDER — IBUPROFEN 200 MG
600 TABLET ORAL EVERY 6 HOURS
Qty: 0 | Refills: 0 | Status: DISCONTINUED | OUTPATIENT
Start: 2018-05-26 | End: 2018-05-27

## 2018-05-26 RX ORDER — LANOLIN
1 OINTMENT (GRAM) TOPICAL EVERY 6 HOURS
Qty: 0 | Refills: 0 | Status: DISCONTINUED | OUTPATIENT
Start: 2018-05-26 | End: 2018-05-27

## 2018-05-26 RX ORDER — MAGNESIUM HYDROXIDE 400 MG/1
30 TABLET, CHEWABLE ORAL
Qty: 0 | Refills: 0 | Status: DISCONTINUED | OUTPATIENT
Start: 2018-05-26 | End: 2018-05-27

## 2018-05-26 RX ADMIN — Medication 600 MILLIGRAM(S): at 09:04

## 2018-05-26 RX ADMIN — Medication 30 MILLIGRAM(S): at 00:25

## 2018-05-26 RX ADMIN — Medication 600 MILLIGRAM(S): at 15:00

## 2018-05-26 RX ADMIN — Medication 1 TABLET(S): at 14:23

## 2018-05-26 RX ADMIN — Medication 975 MILLIGRAM(S): at 15:00

## 2018-05-26 RX ADMIN — Medication 600 MILLIGRAM(S): at 14:24

## 2018-05-26 RX ADMIN — Medication 600 MILLIGRAM(S): at 08:39

## 2018-05-26 RX ADMIN — Medication 975 MILLIGRAM(S): at 14:24

## 2018-05-26 NOTE — PROGRESS NOTE ADULT - SUBJECTIVE AND OBJECTIVE BOX
OB Attending Note    S: Patient doing well. Minimal lochia. Pain controlled.    O: Vital Signs Last 24 Hrs  T(C): 36.8 (26 May 2018 03:55), Max: 36.8 (26 May 2018 00:10)  T(F): 98.3 (26 May 2018 03:55), Max: 98.3 (26 May 2018 03:55)  HR: 103 (26 May 2018 03:55) (96 - 115)  BP: 124/81 (26 May 2018 03:55) (123/68 - 145/86)  BP(mean): --  RR: 18 (26 May 2018 03:55) (18 - 20)  SpO2: 99% (26 May 2018 02:10) (98% - 100%)    Gen: NAD  Abd: soft, NT, ND, fundus firm below umbilicus  Lochia: min  Perineum healing well  Ext: no tenderness    Labs:                        11.2   x     )-----------( x        ( 26 May 2018 06:11 )             34.4       A: 32y PPD#1 s/p  doing well.    Plan: cont PP care  OOB/ambulation  Pain control  Circ counseling done, risks and benefits reviewed, Risks include not limited to bleeding, infection, organ damage, permanent cosmetic changes and need for repeat circ - not cleared at this time

## 2018-05-26 NOTE — PROGRESS NOTE ADULT - SUBJECTIVE AND OBJECTIVE BOX
Postpartum Note- PPD#1    Prenatal Labs  Blood type: O Positive  Rubella IgG: Imm  RPR: Negative        S:Patient w/o complaints, pain is controlled.    Pt is OOB, tolerating PO, voiding. Lochia WNL.     O:  Vital Signs Last 24 Hrs  T(C): 36.8 (26 May 2018 03:55), Max: 36.8 (26 May 2018 00:10)  T(F): 98.3 (26 May 2018 03:55), Max: 98.3 (26 May 2018 03:55)  HR: 103 (26 May 2018 03:55) (96 - 115)  BP: 124/81 (26 May 2018 03:55) (123/68 - 145/86)  BP(mean): --  RR: 18 (26 May 2018 03:55) (18 - 20)  SpO2: 99% (26 May 2018 02:10) (98% - 100%)     Gen: NAD  Abdomen: Soft, nontender, non-distended, fundus firm.  Vaginal: Lochia WNL,    Ext:  Neg calf tenderness    LABS:    Hemoglobin: 11.2 g/dL (05-26 @ 06:11)  Hemoglobin: 12.1 g/dL (05-25 @ 23:13)      Hematocrit: 34.4 % (05-26 @ 06:11)  Hematocrit: 38.6 % (05-25 @ 23:13)

## 2018-05-26 NOTE — PROGRESS NOTE ADULT - ASSESSMENT
A/P:  32y [id number] # 1   S/P  with second degree laceration     Doing Well    PMHx:  Current Issues:

## 2018-05-27 ENCOUNTER — TRANSCRIPTION ENCOUNTER (OUTPATIENT)
Age: 33
End: 2018-05-27

## 2018-05-27 VITALS
HEART RATE: 92 BPM | DIASTOLIC BLOOD PRESSURE: 80 MMHG | TEMPERATURE: 99 F | RESPIRATION RATE: 18 BRPM | SYSTOLIC BLOOD PRESSURE: 123 MMHG

## 2018-05-27 PROCEDURE — 85018 HEMOGLOBIN: CPT

## 2018-05-27 PROCEDURE — 81001 URINALYSIS AUTO W/SCOPE: CPT

## 2018-05-27 PROCEDURE — 59050 FETAL MONITOR W/REPORT: CPT

## 2018-05-27 PROCEDURE — 85027 COMPLETE CBC AUTOMATED: CPT

## 2018-05-27 PROCEDURE — 86780 TREPONEMA PALLIDUM: CPT

## 2018-05-27 PROCEDURE — 84156 ASSAY OF PROTEIN URINE: CPT

## 2018-05-27 PROCEDURE — 80053 COMPREHEN METABOLIC PANEL: CPT

## 2018-05-27 PROCEDURE — 85610 PROTHROMBIN TIME: CPT

## 2018-05-27 PROCEDURE — 85014 HEMATOCRIT: CPT

## 2018-05-27 PROCEDURE — 85384 FIBRINOGEN ACTIVITY: CPT

## 2018-05-27 PROCEDURE — 84550 ASSAY OF BLOOD/URIC ACID: CPT

## 2018-05-27 PROCEDURE — 59025 FETAL NON-STRESS TEST: CPT

## 2018-05-27 PROCEDURE — G0463: CPT

## 2018-05-27 PROCEDURE — 83615 LACTATE (LD) (LDH) ENZYME: CPT

## 2018-05-27 PROCEDURE — 85730 THROMBOPLASTIN TIME PARTIAL: CPT

## 2018-05-27 RX ORDER — ACETAMINOPHEN 500 MG
3 TABLET ORAL
Qty: 0 | Refills: 0 | DISCHARGE
Start: 2018-05-27

## 2018-05-27 RX ORDER — IBUPROFEN 200 MG
1 TABLET ORAL
Qty: 0 | Refills: 0 | DISCHARGE
Start: 2018-05-27

## 2018-05-27 RX ADMIN — Medication 600 MILLIGRAM(S): at 05:33

## 2018-05-27 RX ADMIN — Medication 600 MILLIGRAM(S): at 20:40

## 2018-05-27 RX ADMIN — Medication 1 TABLET(S): at 20:41

## 2018-05-27 RX ADMIN — Medication 975 MILLIGRAM(S): at 20:40

## 2018-05-27 RX ADMIN — Medication 975 MILLIGRAM(S): at 08:15

## 2018-05-27 RX ADMIN — Medication 975 MILLIGRAM(S): at 07:43

## 2018-05-27 RX ADMIN — Medication 975 MILLIGRAM(S): at 21:15

## 2018-05-27 RX ADMIN — Medication 975 MILLIGRAM(S): at 13:59

## 2018-05-27 RX ADMIN — Medication 975 MILLIGRAM(S): at 14:30

## 2018-05-27 RX ADMIN — Medication 600 MILLIGRAM(S): at 14:30

## 2018-05-27 RX ADMIN — Medication 600 MILLIGRAM(S): at 13:58

## 2018-05-27 RX ADMIN — Medication 600 MILLIGRAM(S): at 21:15

## 2018-05-27 NOTE — DISCHARGE NOTE OB - HOSPITAL COURSE
Patient presented at term in labor. She had an epidural. She had a normal labor course and delivered a male infant vaginally. H/H 11.2/34.4

## 2018-05-27 NOTE — DISCHARGE NOTE OB - CARE PLAN
Principal Discharge DX:	Normal vaginal delivery  Goal:	establish breastfeeding  Assessment and plan of treatment:	PPD 2, s/p  stable for discharge

## 2018-05-27 NOTE — DISCHARGE NOTE OB - MEDICATION SUMMARY - MEDICATIONS TO TAKE
I will START or STAY ON the medications listed below when I get home from the hospital:    acetaminophen 325 mg oral tablet  -- 3 tab(s) by mouth every 6 hours  -- Indication: For for pain    ibuprofen 600 mg oral tablet  -- 1 tab(s) by mouth every 6 hours  -- Indication: For for pain    Prenatal Multivitamins with Folic Acid 1 mg oral tablet  -- 1 tab(s) by mouth once a day  -- Indication: For general wellness    Macrobid 100 mg oral capsule  -- 1 cap(s) by mouth 2 times a day MDD:2  -- Finish all this medication unless otherwise directed by prescriber.  May discolor urine or feces.  Take with food or milk.    -- Indication: For until prescription is completed as prescribed by ordering MD

## 2018-05-27 NOTE — PROGRESS NOTE ADULT - SUBJECTIVE AND OBJECTIVE BOX
PPD#2- ATTENDING NOTE    S: Patient doing well. Minimal lochia. Pain controlled.    O: Vital Signs Last 24 Hrs  T(C): 36.8 (27 May 2018 05:00), Max: 36.8 (27 May 2018 05:00)  T(F): 98.3 (27 May 2018 05:00), Max: 98.3 (27 May 2018 05:00)  HR: 88 (27 May 2018 05:00) (88 - 101)  BP: 126/84 (27 May 2018 05:00) (119/79 - 126/84)  BP(mean): --  RR: 18 (27 May 2018 05:00) (18 - 18)  SpO2: --    Gen: NAD  Abd: soft, NT, ND, fundus firm below umbilicus  Lochia: moderate  Ext: no tenderness, no hyper reflexia    Labs:                        11.2   x     )-----------( x        ( 26 May 2018 06:11 )             34.4       A:     Office [phone]  Dr. Keane

## 2018-05-27 NOTE — PROGRESS NOTE ADULT - PROBLEM SELECTOR PLAN 1
32y PPD#2 s/p  doing well.    Plan:  Analgesia prn  Regular diet  Discharge instruction given  F/U 6 weeks
Increase OOB  Regular diet  PO Pain protocol  AM H&H  Routine Postpartum Care

## 2018-05-27 NOTE — DISCHARGE NOTE OB - PATIENT PORTAL LINK FT
You can access the [facility] Patient Portal, offered by [facility], by registering with the following website: [email or web address]

## 2018-05-27 NOTE — DISCHARGE NOTE OB - CARE PROVIDER_API CALL
Bren Mccord), Obstetrics and Gynecology  [address] [facility]  [address] [place] [address]  Phone: [phone]  Fax: [phone]

## 2019-06-12 ENCOUNTER — RESULT REVIEW (OUTPATIENT)
Age: 34
End: 2019-06-12

## 2020-06-15 ENCOUNTER — RESULT REVIEW (OUTPATIENT)
Age: 35
End: 2020-06-15

## 2021-07-12 ENCOUNTER — APPOINTMENT (OUTPATIENT)
Dept: OBGYN | Facility: CLINIC | Age: 36
End: 2021-07-12

## 2021-07-13 ENCOUNTER — APPOINTMENT (OUTPATIENT)
Dept: OBGYN | Facility: CLINIC | Age: 36
End: 2021-07-13

## 2022-08-04 ENCOUNTER — RESULT REVIEW (OUTPATIENT)
Age: 37
End: 2022-08-04

## 2023-01-06 ENCOUNTER — OUTPATIENT (OUTPATIENT)
Dept: OUTPATIENT SERVICES | Facility: HOSPITAL | Age: 38
LOS: 1 days | End: 2023-01-06
Payer: COMMERCIAL

## 2023-01-06 VITALS
HEART RATE: 79 BPM | TEMPERATURE: 98 F | HEIGHT: 66.5 IN | SYSTOLIC BLOOD PRESSURE: 114 MMHG | OXYGEN SATURATION: 100 % | DIASTOLIC BLOOD PRESSURE: 77 MMHG | RESPIRATION RATE: 14 BRPM | WEIGHT: 197.09 LBS

## 2023-01-06 DIAGNOSIS — Z87.42 PERSONAL HISTORY OF OTHER DISEASES OF THE FEMALE GENITAL TRACT: ICD-10-CM

## 2023-01-06 DIAGNOSIS — Z90.49 ACQUIRED ABSENCE OF OTHER SPECIFIED PARTS OF DIGESTIVE TRACT: Chronic | ICD-10-CM

## 2023-01-06 DIAGNOSIS — Z98.890 OTHER SPECIFIED POSTPROCEDURAL STATES: Chronic | ICD-10-CM

## 2023-01-06 DIAGNOSIS — N88.3 INCOMPETENCE OF CERVIX UTERI: ICD-10-CM

## 2023-01-06 LAB
ANION GAP SERPL CALC-SCNC: 8 MMOL/L — SIGNIFICANT CHANGE UP (ref 5–17)
BUN SERPL-MCNC: 5 MG/DL — LOW (ref 7–23)
CALCIUM SERPL-MCNC: 9.4 MG/DL — SIGNIFICANT CHANGE UP (ref 8.4–10.5)
CHLORIDE SERPL-SCNC: 102 MMOL/L — SIGNIFICANT CHANGE UP (ref 96–108)
CO2 SERPL-SCNC: 24 MMOL/L — SIGNIFICANT CHANGE UP (ref 22–31)
CREAT SERPL-MCNC: 0.65 MG/DL — SIGNIFICANT CHANGE UP (ref 0.5–1.3)
EGFR: 118 ML/MIN/1.73M2 — SIGNIFICANT CHANGE UP
GLUCOSE SERPL-MCNC: 79 MG/DL — SIGNIFICANT CHANGE UP (ref 70–99)
HCT VFR BLD CALC: 33.4 % — LOW (ref 34.5–45)
HGB BLD-MCNC: 11.1 G/DL — LOW (ref 11.5–15.5)
MCHC RBC-ENTMCNC: 26.6 PG — LOW (ref 27–34)
MCHC RBC-ENTMCNC: 33.2 GM/DL — SIGNIFICANT CHANGE UP (ref 32–36)
MCV RBC AUTO: 79.9 FL — LOW (ref 80–100)
NRBC # BLD: 0 /100 WBCS — SIGNIFICANT CHANGE UP (ref 0–0)
PLATELET # BLD AUTO: 335 K/UL — SIGNIFICANT CHANGE UP (ref 150–400)
POTASSIUM SERPL-MCNC: 4.3 MMOL/L — SIGNIFICANT CHANGE UP (ref 3.5–5.3)
POTASSIUM SERPL-SCNC: 4.3 MMOL/L — SIGNIFICANT CHANGE UP (ref 3.5–5.3)
RBC # BLD: 4.18 M/UL — SIGNIFICANT CHANGE UP (ref 3.8–5.2)
RBC # FLD: 13.2 % — SIGNIFICANT CHANGE UP (ref 10.3–14.5)
SODIUM SERPL-SCNC: 134 MMOL/L — LOW (ref 135–145)
WBC # BLD: 8.2 K/UL — SIGNIFICANT CHANGE UP (ref 3.8–10.5)
WBC # FLD AUTO: 8.2 K/UL — SIGNIFICANT CHANGE UP (ref 3.8–10.5)

## 2023-01-06 PROCEDURE — 80048 BASIC METABOLIC PNL TOTAL CA: CPT

## 2023-01-06 PROCEDURE — G0463: CPT

## 2023-01-06 PROCEDURE — 85027 COMPLETE CBC AUTOMATED: CPT

## 2023-01-06 PROCEDURE — 36415 COLL VENOUS BLD VENIPUNCTURE: CPT

## 2023-01-06 RX ORDER — SODIUM CHLORIDE 9 MG/ML
3 INJECTION INTRAMUSCULAR; INTRAVENOUS; SUBCUTANEOUS EVERY 8 HOURS
Refills: 0 | Status: DISCONTINUED | OUTPATIENT
Start: 2023-01-18 | End: 2023-02-01

## 2023-01-06 RX ORDER — SODIUM CHLORIDE 9 MG/ML
1000 INJECTION, SOLUTION INTRAVENOUS
Refills: 0 | Status: DISCONTINUED | OUTPATIENT
Start: 2023-01-18 | End: 2023-02-01

## 2023-01-06 NOTE — H&P PST ADULT - HISTORY OF PRESENT ILLNESS
38 y/o F ====== presents at ===GA prior to cervical cerclage.      Denies Hx of Covid-19 Infx.   Covid swab on   36 y/o  F with PMHx of "irregular heart beat". Presents to PST at 10w 6d GA prior to cervical cerclage.  No complications with current pregnancy other than nausea.    Patient denies SOB, CP, palpitation, blood in the stool or urine, N/V/D/C, HA, dizziness, seizures.    Denies Hx of Covid-19 Infx.   Covid swab on 1/15/23

## 2023-01-06 NOTE — H&P PST ADULT - NSICDXPASTSURGICALHX_GEN_ALL_CORE_FT
PAST SURGICAL HISTORY:  H/O hernia repair     History of cervical cerclage     S/P cholecystectomy

## 2023-01-06 NOTE — H&P PST ADULT - NS ATTEND AMEND GEN_ALL_CORE FT
38 YO , 12W 2D FOR CERCLAGE. HAD 22-23 WEEKS 2 TO INC CVX. HAD CERCLAGE PLACED FOR #2. CERCLAGE REMOVED 36 WEEKS. HAD PRECIP DELIVERY 39W. FOR CERCLAGE TODAY.

## 2023-01-06 NOTE — H&P PST ADULT - PROBLEM SELECTOR PLAN 1
Procedure: Cervical cerclage 1/18/23  PST labs pending  AC: None  Medication changes:  Medical/cardiac evaluation pending  Covid swab  DASI score:  Mallampati score:  STOP-BANG score: Procedure: Cervical cerclage 1/18/23  PST labs pending  AC: None  Medication changes: None  Will request last cardiologist note with Echo and Holter monitor- patient with "irregular heart beat" never went for results of work up  Covid swab 1/15/23

## 2023-01-15 ENCOUNTER — OUTPATIENT (OUTPATIENT)
Dept: OUTPATIENT SERVICES | Facility: HOSPITAL | Age: 38
LOS: 1 days | End: 2023-01-15
Payer: COMMERCIAL

## 2023-01-15 DIAGNOSIS — Z11.52 ENCOUNTER FOR SCREENING FOR COVID-19: ICD-10-CM

## 2023-01-15 DIAGNOSIS — Z90.49 ACQUIRED ABSENCE OF OTHER SPECIFIED PARTS OF DIGESTIVE TRACT: Chronic | ICD-10-CM

## 2023-01-15 DIAGNOSIS — Z98.890 OTHER SPECIFIED POSTPROCEDURAL STATES: Chronic | ICD-10-CM

## 2023-01-15 LAB — SARS-COV-2 RNA SPEC QL NAA+PROBE: SIGNIFICANT CHANGE UP

## 2023-01-15 PROCEDURE — U0005: CPT

## 2023-01-15 PROCEDURE — C9803: CPT

## 2023-01-15 PROCEDURE — U0003: CPT

## 2023-01-17 ENCOUNTER — TRANSCRIPTION ENCOUNTER (OUTPATIENT)
Age: 38
End: 2023-01-17

## 2023-01-17 PROBLEM — Z86.79 PERSONAL HISTORY OF OTHER DISEASES OF THE CIRCULATORY SYSTEM: Chronic | Status: ACTIVE | Noted: 2023-01-06

## 2023-01-17 PROBLEM — J30.2 OTHER SEASONAL ALLERGIC RHINITIS: Chronic | Status: ACTIVE | Noted: 2023-01-06

## 2023-01-18 ENCOUNTER — TRANSCRIPTION ENCOUNTER (OUTPATIENT)
Age: 38
End: 2023-01-18

## 2023-01-18 ENCOUNTER — OUTPATIENT (OUTPATIENT)
Dept: OUTPATIENT SERVICES | Facility: HOSPITAL | Age: 38
LOS: 1 days | End: 2023-01-18
Payer: COMMERCIAL

## 2023-01-18 VITALS
RESPIRATION RATE: 16 BRPM | SYSTOLIC BLOOD PRESSURE: 147 MMHG | WEIGHT: 197.09 LBS | HEART RATE: 70 BPM | HEIGHT: 66.5 IN | OXYGEN SATURATION: 100 % | DIASTOLIC BLOOD PRESSURE: 85 MMHG | TEMPERATURE: 97 F

## 2023-01-18 VITALS
DIASTOLIC BLOOD PRESSURE: 62 MMHG | HEART RATE: 75 BPM | OXYGEN SATURATION: 100 % | RESPIRATION RATE: 19 BRPM | SYSTOLIC BLOOD PRESSURE: 126 MMHG

## 2023-01-18 DIAGNOSIS — Z98.890 OTHER SPECIFIED POSTPROCEDURAL STATES: Chronic | ICD-10-CM

## 2023-01-18 DIAGNOSIS — N88.3 INCOMPETENCE OF CERVIX UTERI: ICD-10-CM

## 2023-01-18 DIAGNOSIS — Z90.49 ACQUIRED ABSENCE OF OTHER SPECIFIED PARTS OF DIGESTIVE TRACT: Chronic | ICD-10-CM

## 2023-01-18 PROCEDURE — 59320 REVISION OF CERVIX: CPT

## 2023-01-18 RX ORDER — INDOMETHACIN 50 MG
50 CAPSULE ORAL ONCE
Refills: 0 | Status: COMPLETED | OUTPATIENT
Start: 2023-01-18 | End: 2023-01-18

## 2023-01-18 RX ORDER — METRONIDAZOLE 7.5 MG/G
1 GEL VAGINAL EVERY 24 HOURS
Refills: 0 | Status: DISCONTINUED | OUTPATIENT
Start: 2023-01-18 | End: 2023-02-01

## 2023-01-18 RX ORDER — LIDOCAINE HCL 20 MG/ML
0.2 VIAL (ML) INJECTION ONCE
Refills: 0 | Status: COMPLETED | OUTPATIENT
Start: 2023-01-18 | End: 2023-01-18

## 2023-01-18 RX ADMIN — Medication 50 MILLIGRAM(S): at 11:55

## 2023-01-18 RX ADMIN — Medication 50 MILLIGRAM(S): at 12:55

## 2023-01-18 RX ADMIN — SODIUM CHLORIDE 3 MILLILITER(S): 9 INJECTION INTRAMUSCULAR; INTRAVENOUS; SUBCUTANEOUS at 14:32

## 2023-01-18 RX ADMIN — SODIUM CHLORIDE 100 MILLILITER(S): 9 INJECTION, SOLUTION INTRAVENOUS at 08:43

## 2023-01-18 RX ADMIN — SODIUM CHLORIDE 3 MILLILITER(S): 9 INJECTION INTRAMUSCULAR; INTRAVENOUS; SUBCUTANEOUS at 08:42

## 2023-01-18 RX ADMIN — SODIUM CHLORIDE 100 MILLILITER(S): 9 INJECTION, SOLUTION INTRAVENOUS at 11:55

## 2023-01-18 NOTE — BRIEF OPERATIVE NOTE - NSICDXBRIEFPREOP_GEN_ALL_CORE_FT
PRE-OP DIAGNOSIS:  Incompetent cervix in pregnancy, second trimester 18-Jan-2023 11:14:10  Onesimo Hong

## 2023-01-18 NOTE — ASU PREOP CHECKLIST - TAMPON REMOVED
Parent of patient notified of Dr. Bliss' comments. Mother verbalized understanding of information given.    n/a

## 2023-01-18 NOTE — BRIEF OPERATIVE NOTE - NSICDXBRIEFPOSTOP_GEN_ALL_CORE_FT
POST-OP DIAGNOSIS:  Incompetent cervix in pregnancy, second trimester 18-Jan-2023 11:14:29  Onesimo Hong

## 2023-01-18 NOTE — ASU DISCHARGE PLAN (ADULT/PEDIATRIC) - NS MD DC FALL RISK RISK
For information on Fall & Injury Prevention, visit: [email or web address] OR  [email or web address] OR  https://www.cdc.gov/steadi/patient.html

## 2023-01-18 NOTE — ASU PREOP CHECKLIST - INTERNAL PROSTHESES
Principal Discharge DX:	Intestinal obstruction, unspecified cause, unspecified whether partial or complete  
no

## 2023-01-18 NOTE — PRE-ANESTHESIA EVALUATION ADULT - NSANTHPMHFT_GEN_ALL_CORE
1st av block on ECG  2nd degree av block type 1 self limited events on holter  cardiology eval and results in the chart  N/V with pregnancy

## 2023-01-18 NOTE — ASU DISCHARGE PLAN (ADULT/PEDIATRIC) - CARE PROVIDER_API CALL
Onesimo Hong)  Obstetrics and Gynecology  [address] [facility], [address]  [place] [address]  Phone: [phone]  Fax: [phone]  Follow Up Time:

## 2023-01-25 ENCOUNTER — TRANSCRIPTION ENCOUNTER (OUTPATIENT)
Age: 38
End: 2023-01-25

## 2023-03-13 ENCOUNTER — NON-APPOINTMENT (OUTPATIENT)
Age: 38
End: 2023-03-13

## 2023-03-13 ENCOUNTER — ASOB RESULT (OUTPATIENT)
Age: 38
End: 2023-03-13

## 2023-03-13 ENCOUNTER — APPOINTMENT (OUTPATIENT)
Dept: ANTEPARTUM | Facility: CLINIC | Age: 38
End: 2023-03-13
Payer: COMMERCIAL

## 2023-03-13 PROCEDURE — 76811 OB US DETAILED SNGL FETUS: CPT

## 2023-03-13 PROCEDURE — 76817 TRANSVAGINAL US OBSTETRIC: CPT

## 2023-05-09 ENCOUNTER — APPOINTMENT (OUTPATIENT)
Dept: ORTHOPEDIC SURGERY | Facility: CLINIC | Age: 38
End: 2023-05-09
Payer: COMMERCIAL

## 2023-05-09 VITALS — BODY MASS INDEX: 33.27 KG/M2 | WEIGHT: 212 LBS | HEIGHT: 67 IN

## 2023-05-09 DIAGNOSIS — M25.371 OTHER INSTABILITY, RIGHT ANKLE: ICD-10-CM

## 2023-05-09 DIAGNOSIS — Z78.9 OTHER SPECIFIED HEALTH STATUS: ICD-10-CM

## 2023-05-09 PROCEDURE — 99204 OFFICE O/P NEW MOD 45 MIN: CPT

## 2023-05-09 PROCEDURE — L4361: CPT | Mod: RT

## 2023-05-12 PROBLEM — M25.371 INSTABILITY OF RIGHT ANKLE JOINT: Status: ACTIVE | Noted: 2023-05-12

## 2023-05-12 NOTE — HISTORY OF PRESENT ILLNESS
[de-identified] : Here for injury to the left ankle that happened on Sunday while making bed at home. Patient rolled ankle while in flip flops and had gone to  near her and given aircast. Patient is 7 months pregnant.

## 2023-05-12 NOTE — PHYSICAL EXAM
[Mild] : mild swelling of medial ankle [2+] : posterior tibialis pulse: 2+ [Left] : left ankle [Weight -] : weightbearing [There are no fractures, subluxations or dislocations. No significant abnormalities are seen] : There are no fractures, subluxations or dislocations. No significant abnormalities are seen [] : no ecchymosis [FreeTextEntry9] : limited due to pain [de-identified] : limited due to pain

## 2023-05-12 NOTE — DISCUSSION/SUMMARY
[Medication Risks Reviewed] : Medication risks reviewed [Surgical risks reviewed] : Surgical risks reviewed [de-identified] : \par Reviewed patients X-Ray left ankle - benign. \par Discussed risks of potential surgery. However, due to the risks of the surgery, we will try NSAIDs and therapy. Discussed management of medication.\par \par The patient has been using an air cast, however still has inability to ambulate without an antalgic gait. Will begin use of pneumatic CAM boot for return to daily activity. \par Gradually discontinue use of crutches. \par Start physical therapy. \par Discussed prescribing oral NSAIDs, however will hold off secondary to pregnancy. discussed mri but again pregnancy complicated situation\par Follow up 2 weeks. \par \par

## 2023-06-27 ENCOUNTER — APPOINTMENT (OUTPATIENT)
Dept: ORTHOPEDIC SURGERY | Facility: CLINIC | Age: 38
End: 2023-06-27
Payer: COMMERCIAL

## 2023-06-27 VITALS — BODY MASS INDEX: 33.27 KG/M2 | HEIGHT: 67 IN | WEIGHT: 212 LBS

## 2023-06-27 DIAGNOSIS — S93.409A SPRAIN OF UNSPECIFIED LIGAMENT OF UNSPECIFIED ANKLE, INITIAL ENCOUNTER: ICD-10-CM

## 2023-06-27 PROCEDURE — L1902: CPT | Mod: LT

## 2023-06-27 PROCEDURE — 99213 OFFICE O/P EST LOW 20 MIN: CPT

## 2023-07-03 NOTE — IMAGING
[de-identified] : The patient continues to improve on a gradual, interval basis. The patient is ligamentously stable upon physical exam today.  Discussed the importance of gradually increasing activity as tolerated. The patient is cleared for gym and sports. Follow up on a PRN basis unless new symptoms arise or worsen.

## 2023-07-03 NOTE — PHYSICAL EXAM
[Left] : left foot and ankle [Mild] : mild swelling of medial ankle [2+] : posterior tibialis pulse: 2+ [] : no ecchymosis [FreeTextEntry9] : limited due to pain [de-identified] : limited due to pain

## 2023-07-03 NOTE — DISCUSSION/SUMMARY
[Medication Risks Reviewed] : Medication risks reviewed [Surgical risks reviewed] : Surgical risks reviewed [de-identified] : \par \par The patient reports gradual improvement in symptoms. \par \par The patient will begin use of Air sport ankle brace to ensure stability and avoid further injury - fitted and dispensed in office today. \par \par Gradually discontinue use of pneumatic CAM boot, transition into Air sport brace.\par \par Continue advancing activity as tolerated. \par \par The patient will follow up for indefinite treatment after pregnancy. \par \par Continue physical therapy. \par \par Discussed prescribing oral NSAIDs, however will hold off secondary to pregnancy. discussed mri but again pregnancy complicated situation\par Follow up on a PRN basis unless new symptoms arise. \par

## 2023-07-03 NOTE — HISTORY OF PRESENT ILLNESS
[de-identified] : Pt is here for a follow up of the left ankle. Left ankle is doing well, only has some pain in left ankle after PT ([facility] in [place]). Has continued use of CAM boot for the left ankle which has been helping, notes some swelling in the left ankle when the boot is not on. Pt is pregnant, due date is end of July.

## 2023-07-24 ENCOUNTER — TRANSCRIPTION ENCOUNTER (OUTPATIENT)
Age: 38
End: 2023-07-24

## 2023-07-24 ENCOUNTER — INPATIENT (INPATIENT)
Facility: HOSPITAL | Age: 38
LOS: 1 days | Discharge: HOME | End: 2023-07-26
Attending: OBSTETRICS & GYNECOLOGY | Admitting: OBSTETRICS & GYNECOLOGY
Payer: COMMERCIAL

## 2023-07-24 VITALS — SYSTOLIC BLOOD PRESSURE: 148 MMHG | OXYGEN SATURATION: 100 % | DIASTOLIC BLOOD PRESSURE: 83 MMHG | HEART RATE: 113 BPM

## 2023-07-24 DIAGNOSIS — Z90.49 ACQUIRED ABSENCE OF OTHER SPECIFIED PARTS OF DIGESTIVE TRACT: Chronic | ICD-10-CM

## 2023-07-24 DIAGNOSIS — O26.899 OTHER SPECIFIED PREGNANCY RELATED CONDITIONS, UNSPECIFIED TRIMESTER: ICD-10-CM

## 2023-07-24 DIAGNOSIS — Z98.890 OTHER SPECIFIED POSTPROCEDURAL STATES: Chronic | ICD-10-CM

## 2023-07-24 DIAGNOSIS — Z3A.39 39 WEEKS GESTATION OF PREGNANCY: ICD-10-CM

## 2023-07-24 DIAGNOSIS — Z34.80 ENCOUNTER FOR SUPERVISION OF OTHER NORMAL PREGNANCY, UNSPECIFIED TRIMESTER: ICD-10-CM

## 2023-07-24 LAB
ALBUMIN SERPL ELPH-MCNC: 3.6 G/DL — SIGNIFICANT CHANGE UP (ref 3.3–5)
ALP SERPL-CCNC: 111 U/L — SIGNIFICANT CHANGE UP (ref 40–120)
ALT FLD-CCNC: 11 U/L — SIGNIFICANT CHANGE UP (ref 10–45)
ANION GAP SERPL CALC-SCNC: 10 MMOL/L — SIGNIFICANT CHANGE UP (ref 5–17)
APPEARANCE UR: CLEAR — SIGNIFICANT CHANGE UP
APTT BLD: 27 SEC — LOW (ref 27.5–35.5)
AST SERPL-CCNC: 11 U/L — SIGNIFICANT CHANGE UP (ref 10–40)
BASOPHILS # BLD AUTO: 0.02 K/UL — SIGNIFICANT CHANGE UP (ref 0–0.2)
BASOPHILS NFR BLD AUTO: 0.2 % — SIGNIFICANT CHANGE UP (ref 0–2)
BILIRUB SERPL-MCNC: 0.2 MG/DL — SIGNIFICANT CHANGE UP (ref 0.2–1.2)
BILIRUB UR-MCNC: NEGATIVE — SIGNIFICANT CHANGE UP
BLD GP AB SCN SERPL QL: NEGATIVE — SIGNIFICANT CHANGE UP
BUN SERPL-MCNC: 9 MG/DL — SIGNIFICANT CHANGE UP (ref 7–23)
CALCIUM SERPL-MCNC: 10.1 MG/DL — SIGNIFICANT CHANGE UP (ref 8.4–10.5)
CHLORIDE SERPL-SCNC: 103 MMOL/L — SIGNIFICANT CHANGE UP (ref 96–108)
CO2 SERPL-SCNC: 23 MMOL/L — SIGNIFICANT CHANGE UP (ref 22–31)
COLOR SPEC: SIGNIFICANT CHANGE UP
CREAT ?TM UR-MCNC: 64 MG/DL — SIGNIFICANT CHANGE UP
CREAT SERPL-MCNC: 0.77 MG/DL — SIGNIFICANT CHANGE UP (ref 0.5–1.3)
DIFF PNL FLD: ABNORMAL
EGFR: 103 ML/MIN/1.73M2 — SIGNIFICANT CHANGE UP
EOSINOPHIL # BLD AUTO: 0.06 K/UL — SIGNIFICANT CHANGE UP (ref 0–0.5)
EOSINOPHIL NFR BLD AUTO: 0.6 % — SIGNIFICANT CHANGE UP (ref 0–6)
GLUCOSE SERPL-MCNC: 97 MG/DL — SIGNIFICANT CHANGE UP (ref 70–99)
GLUCOSE UR QL: ABNORMAL
HCT VFR BLD CALC: 36 % — SIGNIFICANT CHANGE UP (ref 34.5–45)
HGB BLD-MCNC: 11.5 G/DL — SIGNIFICANT CHANGE UP (ref 11.5–15.5)
IMM GRANULOCYTES NFR BLD AUTO: 1.5 % — HIGH (ref 0–0.9)
INR BLD: 0.98 RATIO — SIGNIFICANT CHANGE UP (ref 0.88–1.16)
KETONES UR-MCNC: NEGATIVE — SIGNIFICANT CHANGE UP
LDH SERPL L TO P-CCNC: 137 U/L — SIGNIFICANT CHANGE UP (ref 50–242)
LEUKOCYTE ESTERASE UR-ACNC: ABNORMAL
LYMPHOCYTES # BLD AUTO: 1.52 K/UL — SIGNIFICANT CHANGE UP (ref 1–3.3)
LYMPHOCYTES # BLD AUTO: 15.6 % — SIGNIFICANT CHANGE UP (ref 13–44)
MCHC RBC-ENTMCNC: 24.6 PG — LOW (ref 27–34)
MCHC RBC-ENTMCNC: 31.9 GM/DL — LOW (ref 32–36)
MCV RBC AUTO: 77.1 FL — LOW (ref 80–100)
MONOCYTES # BLD AUTO: 0.71 K/UL — SIGNIFICANT CHANGE UP (ref 0–0.9)
MONOCYTES NFR BLD AUTO: 7.3 % — SIGNIFICANT CHANGE UP (ref 2–14)
NEUTROPHILS # BLD AUTO: 7.28 K/UL — SIGNIFICANT CHANGE UP (ref 1.8–7.4)
NEUTROPHILS NFR BLD AUTO: 74.8 % — SIGNIFICANT CHANGE UP (ref 43–77)
NITRITE UR-MCNC: NEGATIVE — SIGNIFICANT CHANGE UP
NRBC # BLD: 0 /100 WBCS — SIGNIFICANT CHANGE UP (ref 0–0)
PH UR: 7 — SIGNIFICANT CHANGE UP (ref 5–8)
PLATELET # BLD AUTO: 307 K/UL — SIGNIFICANT CHANGE UP (ref 150–400)
POTASSIUM SERPL-MCNC: 4 MMOL/L — SIGNIFICANT CHANGE UP (ref 3.5–5.3)
POTASSIUM SERPL-SCNC: 4 MMOL/L — SIGNIFICANT CHANGE UP (ref 3.5–5.3)
PROT ?TM UR-MCNC: 9 MG/DL — SIGNIFICANT CHANGE UP (ref 0–12)
PROT SERPL-MCNC: 7.3 G/DL — SIGNIFICANT CHANGE UP (ref 6–8.3)
PROT UR-MCNC: NEGATIVE — SIGNIFICANT CHANGE UP
PROT/CREAT UR-RTO: 0.1 RATIO — SIGNIFICANT CHANGE UP (ref 0–0.2)
PROTHROM AB SERPL-ACNC: 11.3 SEC — SIGNIFICANT CHANGE UP (ref 10.5–13.4)
RBC # BLD: 4.67 M/UL — SIGNIFICANT CHANGE UP (ref 3.8–5.2)
RBC # FLD: 17.3 % — HIGH (ref 10.3–14.5)
RH IG SCN BLD-IMP: POSITIVE — SIGNIFICANT CHANGE UP
SODIUM SERPL-SCNC: 136 MMOL/L — SIGNIFICANT CHANGE UP (ref 135–145)
SP GR SPEC: 1.02 — SIGNIFICANT CHANGE UP (ref 1.01–1.02)
T PALLIDUM AB TITR SER: NEGATIVE — SIGNIFICANT CHANGE UP
URATE SERPL-MCNC: 5.2 MG/DL — SIGNIFICANT CHANGE UP (ref 2.5–7)
UROBILINOGEN FLD QL: NEGATIVE — SIGNIFICANT CHANGE UP
WBC # BLD: 9.74 K/UL — SIGNIFICANT CHANGE UP (ref 3.8–10.5)
WBC # FLD AUTO: 9.74 K/UL — SIGNIFICANT CHANGE UP (ref 3.8–10.5)

## 2023-07-24 RX ORDER — IBUPROFEN 200 MG
600 TABLET ORAL EVERY 6 HOURS
Refills: 0 | Status: COMPLETED | OUTPATIENT
Start: 2023-07-24 | End: 2024-06-21

## 2023-07-24 RX ORDER — OXYCODONE HYDROCHLORIDE 5 MG/1
5 TABLET ORAL
Refills: 0 | Status: DISCONTINUED | OUTPATIENT
Start: 2023-07-24 | End: 2023-07-26

## 2023-07-24 RX ORDER — OXYTOCIN 10 UNIT/ML
333.33 VIAL (ML) INJECTION
Qty: 20 | Refills: 0 | Status: DISCONTINUED | OUTPATIENT
Start: 2023-07-24 | End: 2023-07-24

## 2023-07-24 RX ORDER — SIMETHICONE 80 MG/1
80 TABLET, CHEWABLE ORAL EVERY 4 HOURS
Refills: 0 | Status: DISCONTINUED | OUTPATIENT
Start: 2023-07-24 | End: 2023-07-26

## 2023-07-24 RX ORDER — IBUPROFEN 200 MG
600 TABLET ORAL EVERY 6 HOURS
Refills: 0 | Status: DISCONTINUED | OUTPATIENT
Start: 2023-07-24 | End: 2023-07-26

## 2023-07-24 RX ORDER — OXYCODONE HYDROCHLORIDE 5 MG/1
5 TABLET ORAL ONCE
Refills: 0 | Status: DISCONTINUED | OUTPATIENT
Start: 2023-07-24 | End: 2023-07-26

## 2023-07-24 RX ORDER — SODIUM CHLORIDE 9 MG/ML
3 INJECTION INTRAMUSCULAR; INTRAVENOUS; SUBCUTANEOUS EVERY 8 HOURS
Refills: 0 | Status: DISCONTINUED | OUTPATIENT
Start: 2023-07-24 | End: 2023-07-26

## 2023-07-24 RX ORDER — PRAMOXINE HYDROCHLORIDE 150 MG/15G
1 AEROSOL, FOAM RECTAL EVERY 4 HOURS
Refills: 0 | Status: DISCONTINUED | OUTPATIENT
Start: 2023-07-24 | End: 2023-07-26

## 2023-07-24 RX ORDER — HYDROCORTISONE 1 %
1 OINTMENT (GRAM) TOPICAL EVERY 6 HOURS
Refills: 0 | Status: DISCONTINUED | OUTPATIENT
Start: 2023-07-24 | End: 2023-07-26

## 2023-07-24 RX ORDER — AER TRAVELER 0.5 G/1
1 SOLUTION RECTAL; TOPICAL EVERY 4 HOURS
Refills: 0 | Status: DISCONTINUED | OUTPATIENT
Start: 2023-07-24 | End: 2023-07-26

## 2023-07-24 RX ORDER — LANOLIN
1 OINTMENT (GRAM) TOPICAL EVERY 6 HOURS
Refills: 0 | Status: DISCONTINUED | OUTPATIENT
Start: 2023-07-24 | End: 2023-07-26

## 2023-07-24 RX ORDER — SODIUM CHLORIDE 9 MG/ML
500 INJECTION, SOLUTION INTRAVENOUS ONCE
Refills: 0 | Status: COMPLETED | OUTPATIENT
Start: 2023-07-24 | End: 2023-07-24

## 2023-07-24 RX ORDER — MAGNESIUM HYDROXIDE 400 MG/1
30 TABLET, CHEWABLE ORAL
Refills: 0 | Status: DISCONTINUED | OUTPATIENT
Start: 2023-07-24 | End: 2023-07-26

## 2023-07-24 RX ORDER — TETANUS TOXOID, REDUCED DIPHTHERIA TOXOID AND ACELLULAR PERTUSSIS VACCINE, ADSORBED 5; 2.5; 8; 8; 2.5 [IU]/.5ML; [IU]/.5ML; UG/.5ML; UG/.5ML; UG/.5ML
0.5 SUSPENSION INTRAMUSCULAR ONCE
Refills: 0 | Status: COMPLETED | OUTPATIENT
Start: 2023-07-24

## 2023-07-24 RX ORDER — TETANUS TOXOID, REDUCED DIPHTHERIA TOXOID AND ACELLULAR PERTUSSIS VACCINE, ADSORBED 5; 2.5; 8; 8; 2.5 [IU]/.5ML; [IU]/.5ML; UG/.5ML; UG/.5ML; UG/.5ML
0.5 SUSPENSION INTRAMUSCULAR ONCE
Refills: 0 | Status: DISCONTINUED | OUTPATIENT
Start: 2023-07-24 | End: 2023-07-26

## 2023-07-24 RX ORDER — DIPHENHYDRAMINE HCL 50 MG
25 CAPSULE ORAL EVERY 6 HOURS
Refills: 0 | Status: DISCONTINUED | OUTPATIENT
Start: 2023-07-24 | End: 2023-07-26

## 2023-07-24 RX ORDER — KETOROLAC TROMETHAMINE 30 MG/ML
30 SYRINGE (ML) INJECTION ONCE
Refills: 0 | Status: DISCONTINUED | OUTPATIENT
Start: 2023-07-24 | End: 2023-07-24

## 2023-07-24 RX ORDER — BENZOCAINE 10 %
1 GEL (GRAM) MUCOUS MEMBRANE EVERY 6 HOURS
Refills: 0 | Status: DISCONTINUED | OUTPATIENT
Start: 2023-07-24 | End: 2023-07-26

## 2023-07-24 RX ORDER — ACETAMINOPHEN 500 MG
975 TABLET ORAL
Refills: 0 | Status: DISCONTINUED | OUTPATIENT
Start: 2023-07-24 | End: 2023-07-26

## 2023-07-24 RX ORDER — OXYTOCIN 10 UNIT/ML
41.67 VIAL (ML) INJECTION
Qty: 20 | Refills: 0 | Status: DISCONTINUED | OUTPATIENT
Start: 2023-07-24 | End: 2023-07-26

## 2023-07-24 RX ORDER — DIBUCAINE 1 %
1 OINTMENT (GRAM) RECTAL EVERY 6 HOURS
Refills: 0 | Status: DISCONTINUED | OUTPATIENT
Start: 2023-07-24 | End: 2023-07-26

## 2023-07-24 RX ORDER — OXYTOCIN 10 UNIT/ML
2 VIAL (ML) INJECTION
Qty: 30 | Refills: 0 | Status: DISCONTINUED | OUTPATIENT
Start: 2023-07-24 | End: 2023-07-26

## 2023-07-24 RX ORDER — AMPICILLIN TRIHYDRATE 250 MG
1 CAPSULE ORAL EVERY 4 HOURS
Refills: 0 | Status: DISCONTINUED | OUTPATIENT
Start: 2023-07-24 | End: 2023-07-24

## 2023-07-24 RX ORDER — CHLORHEXIDINE GLUCONATE 213 G/1000ML
1 SOLUTION TOPICAL DAILY
Refills: 0 | Status: DISCONTINUED | OUTPATIENT
Start: 2023-07-24 | End: 2023-07-24

## 2023-07-24 RX ORDER — SODIUM CHLORIDE 9 MG/ML
1000 INJECTION, SOLUTION INTRAVENOUS
Refills: 0 | Status: DISCONTINUED | OUTPATIENT
Start: 2023-07-24 | End: 2023-07-24

## 2023-07-24 RX ORDER — AMPICILLIN TRIHYDRATE 250 MG
2 CAPSULE ORAL ONCE
Refills: 0 | Status: COMPLETED | OUTPATIENT
Start: 2023-07-24 | End: 2023-07-24

## 2023-07-24 RX ORDER — KETOROLAC TROMETHAMINE 30 MG/ML
30 SYRINGE (ML) INJECTION ONCE
Refills: 0 | Status: DISCONTINUED | OUTPATIENT
Start: 2023-07-24 | End: 2023-07-26

## 2023-07-24 RX ORDER — CITRIC ACID/SODIUM CITRATE 300-500 MG
15 SOLUTION, ORAL ORAL EVERY 6 HOURS
Refills: 0 | Status: DISCONTINUED | OUTPATIENT
Start: 2023-07-24 | End: 2023-07-24

## 2023-07-24 RX ADMIN — SODIUM CHLORIDE 500 MILLILITER(S): 9 INJECTION, SOLUTION INTRAVENOUS at 12:55

## 2023-07-24 RX ADMIN — Medication 30 MILLIGRAM(S): at 14:54

## 2023-07-24 RX ADMIN — SODIUM CHLORIDE 1000 MILLILITER(S): 9 INJECTION, SOLUTION INTRAVENOUS at 17:45

## 2023-07-24 RX ADMIN — Medication 975 MILLIGRAM(S): at 19:35

## 2023-07-24 RX ADMIN — Medication 125 MILLIUNIT(S)/MIN: at 14:47

## 2023-07-24 RX ADMIN — CHLORHEXIDINE GLUCONATE 1 APPLICATION(S): 213 SOLUTION TOPICAL at 13:22

## 2023-07-24 RX ADMIN — Medication 15 MILLILITER(S): at 13:22

## 2023-07-24 RX ADMIN — Medication 200 GRAM(S): at 10:47

## 2023-07-24 RX ADMIN — Medication 600 MILLIGRAM(S): at 23:19

## 2023-07-24 NOTE — OB RN TRIAGE NOTE - FALL HARM RISK - RISK INTERVENTIONS

## 2023-07-24 NOTE — OB RN DELIVERY SUMMARY - NS_SEPSISRSKCALC_OBGYN_ALL_OB_FT
EOS calculated successfully. EOS Risk Factor: 0.5/1000 live births ([facility] national incidence); GA=39w;Temp=98.24; ROM=12.683; GBS='Positive'; Antibiotics='GBS specific antibiotics > 2 hrs prior to birth'

## 2023-07-24 NOTE — OB RN PATIENT PROFILE - BREASTFEEDING IS BETTER ESTABLISHED WHEN INFANTS ARE ROOMING IN WITH PARENT (23/24 HOURS OF THE DAY)
" feeling better" Pt reports feeling better Pt reports feeling better Pt reports feeling depressed Pt reports feeling depressed Pt was referred by staff nurse to reevaluate 1:1 constant observation for possible discontinuation due to pt is no longer suicidal.  Pt states that "He's starting to feel better less depressed and no longer suicidal."  Pt states he feels safe being in the hospital and hopeful that he could helped with his depression.  Pt currently able to contract for safety states that "He does not need to be on 1:1 constant observation at this time and he will let staff knows if he would have any recurrences of suicidal thoughts. Pt reports feeling better Pt reports feeling better " feeling better" Pt reports feeling better Pt reports feeling better Statement Selected

## 2023-07-24 NOTE — DISCHARGE NOTE OB - MEDICATION SUMMARY - MEDICATIONS TO TAKE
I will START or STAY ON the medications listed below when I get home from the hospital:    ibuprofen 600 mg oral tablet  -- 1 tab(s) by mouth every 6 hours  -- Indication: For mild to moderate pain    acetaminophen 325 mg oral tablet  -- 3 tab(s) by mouth every 6 hours  -- Indication: For mild to moderate pain    dibucaine 1% topical ointment  -- 1 Apply on skin to affected area every 6 hours As needed Perineal discomfort  -- Indication: For perineal discomfort    Prenatal Multivitamins with Folic Acid 1 mg oral tablet  -- 1 tab(s) by mouth once a day  -- Indication: For Supplement

## 2023-07-24 NOTE — CHART NOTE - NSCHARTNOTEFT_GEN_A_CORE
S: Patient evaluated on labor and delivery floor due to tachycardia to 120s. Patient asymptomatic, denies any palpitations, dizziness, chest pain, dyspnea, tachypnea. Appropriate lochia on pad, no blood with fundal check.     O:   Gen: No acute distress  CV: Regular rate and rhythm. Tachycardic. Normal S1, S2.  Resp: Lungs clear to auscultation bilaterally  Abdomen: Soft nontender. Fundus firm.  Extremities: Negative Shruthi's sign. No edema b/l LE.    A/P: Patient is PPD#0 from  with tachycardia. Patient has cardiac risk factors including gestational hypertension. On review, patient resting HR in 100s since admission. Vital signs otherwise stable. Asymptomatic with unremarkable exam.  - Stable for postpartum floor.   - Follow up vital signs on postpartum floor.    MARIELOS Diaz, PGY-1

## 2023-07-24 NOTE — DISCHARGE NOTE OB - PATIENT PORTAL LINK FT
You can access the FollowMyHealth Patient Portal offered by [facility] by registering at the following website: [email or web address]. By joining [facility]’s FollowMyHealth portal, you will also be able to view your health information using other applications (apps) compatible with our system.

## 2023-07-24 NOTE — OB RN PATIENT PROFILE - FALL HARM RISK - RISK INTERVENTIONS

## 2023-07-24 NOTE — OB PROVIDER LABOR PROGRESS NOTE - ASSESSMENT
A/P:  -EFM/[place]  -Continue with ampicillin for gbs ppx  -Continue with exp mgmt  -Anticipate   Dr. Hong in house  Lynn Kirk PA-C
ABS FOR GBS GIVEN. PT MAKING PROGRESS ON HER OWN. DOES NOT WANT EPI.

## 2023-07-24 NOTE — OB RN DELIVERY SUMMARY - NSSELHIDDEN_OBGYN_ALL_OB_FT
[NS_DeliveryAttending1_OBGYN_ALL_OB_FT:[id number]==],[NS_DeliveryAttending2_OBGYN_ALL_OB_FT:[email or web address]=],[NS_DeliveryAssist1_OBGYN_ALL_OB_FT:[email or web address]=],[NS_DeliveryRN_OBGYN_ALL_OB_FT:[id number]=]

## 2023-07-24 NOTE — DISCHARGE NOTE OB - CARE PROVIDER_API CALL
Onesimo Hong.  Obstetrics and Gynecology  [address] [facility], [address]  [place] [address]  Phone: [phone]  Fax: [phone]  Follow Up Time:

## 2023-07-24 NOTE — DISCHARGE NOTE OB - NS MD DC FALL RISK RISK
For information on Fall & Injury Prevention, visit: [email or web address] OR  [email or web address] OR  https://www.cdc.gov/steadi/patient.html

## 2023-07-24 NOTE — OB RN PATIENT PROFILE - NSICDXPASTMEDICALHX_GEN_ALL_CORE_FT
PAST MEDICAL HISTORY:  History of irregular heartbeat     Seasonal allergies      PAST MEDICAL HISTORY:  Asthma     History of irregular heartbeat     Seasonal allergies

## 2023-07-24 NOTE — OB PROVIDER LABOR PROGRESS NOTE - NS_SUBJECTIVE/OBJECTIVE_OBGYN_ALL_OB_FT
PT HAD SROM WHILE MOVING JAYLON TRIAGE TO RM 5
PA Note:  Patient seen and evaluated at bedside. Patient c/o increased pressure.

## 2023-07-24 NOTE — OB PROVIDER DELIVERY SUMMARY - NSSELHIDDEN_OBGYN_ALL_OB_FT
[NS_DeliveryAttending1_OBGYN_ALL_OB_FT:[email or web address]==],[NS_DeliveryAttending2_OBGYN_ALL_OB_FT:[email or web address]=],[NS_DeliveryAssist1_OBGYN_ALL_OB_FT:[email or web address]=]

## 2023-07-24 NOTE — OB PROVIDER H&P - PROBLEM SELECTOR PLAN 1
- Admit to L & D/labs/IVF/NPO  - Fetal status - cat 1  - GBS + --> will start ampicillin  - Pain control - as needed  - Labor management- will consider pitocin augmentation if needed  - Maternal Status - Elevated BP on admission, will send HELLP labs and do serial BP's.  Pt currently asymptomatic  - Consents to be signed  D/W  Dr Gely Gonzalez PA-C

## 2023-07-24 NOTE — OB PROVIDER DELIVERY SUMMARY - NSPROVIDERDELIVERYNOTE_OBGYN_ALL_OB_FT
Spontaneous vaginal delivery of liveborn female infant from OA position with apgars of 9/9. Head, shoulders, and body delivered easily. Infant was suctioned. No mec. 1 minute delayed cord clamping was performed. Cord clamped and cut and infant passed to mother. Placenta delivered intact - placenta noted to have succenturiate lobe. Fundal massage was given and uterine fundus was found to be firm. Vaginal exam revealed an intact cervix, vaginal walls, and sulci. Patient had a 2nd degree laceration in the perineum that was repaired with 2.0 vicryl rapid suture. Excellent hemostasis was noted. Patient was stable and went to recovery. Count was correct x2.  BSUS performed and thin EE noted.  .  Placent to pathology Spontaneous vaginal delivery of liveborn female infant from OA position with apgars of 9/9. Head, shoulders, and body delivered easily. Infant was suctioned. No mec. 1 minute delayed cord clamping was performed. Cord clamped and cut and infant passed to mother. Placenta delivered intact - placenta noted to have succenturiate lobe. Fundal massage was given and uterine fundus was found to be firm. Vaginal exam revealed an intact cervix, vaginal walls, and sulci. Patient had a 2nd degree laceration in the perineum that was repaired with 2.0 vicryl rapid suture. Excellent hemostasis was noted. Patient was stable and went to recovery. Count was correct x2.  BSUS performed and thin EE noted.  .  Placent to pathology  I arrived after delivery as I was attending another delivery. I inspected the 2nd degree laceration repair    alyson win Spontaneous vaginal delivery of liveborn female infant from OA position with apgars of 9/9. Head, shoulders, and body delivered easily. Infant was suctioned. No mec. 1 minute delayed cord clamping was performed. Cord clamped and cut and infant passed to mother. Placenta delivered intact - placenta noted to have succenturiate lobe. Fundal massage was given and uterine fundus was found to be firm. Vaginal exam revealed an intact cervix, vaginal walls, and sulci. Patient had a 2nd degree laceration in the perineum that was repaired with 2.0 vicryl rapid suture. Excellent hemostasis was noted. Patient was stable and went to recovery. Count was correct x2.  BSUS performed and thin EE noted.  .    I arrived after delivery as I was attending another delivery. I inspected the 2nd degree laceration repair    alyson win

## 2023-07-24 NOTE — OB PROVIDER H&P - NSLOWPPHRISK_OBGYN_A_OB
No previous uterine incision/[place] Pregnancy/Less than or equal to 4 previous vaginal births/No known bleeding disorder/No history of postpartum hemorrhage

## 2023-07-24 NOTE — DISCHARGE NOTE OB - HOSPITAL COURSE
PT PRESENTED TO L&D IN LABOR 5 CM. RECEIVED ABS FOR GBS. HAD SROM & PROGRESSED RAPIDLY. HAD  FEMALE. 2ND DEGREE LACERATION. DELIVERED BY S.O. DR MUNOZ. I ARRIVED SHORTLY THEREAFTER. D/C PPD #

## 2023-07-25 RX ORDER — TETANUS TOXOID, REDUCED DIPHTHERIA TOXOID AND ACELLULAR PERTUSSIS VACCINE, ADSORBED 5; 2.5; 8; 8; 2.5 [IU]/.5ML; [IU]/.5ML; UG/.5ML; UG/.5ML; UG/.5ML
0.5 SUSPENSION INTRAMUSCULAR ONCE
Refills: 0 | Status: COMPLETED | OUTPATIENT
Start: 2023-07-25 | End: 2023-07-25

## 2023-07-25 RX ADMIN — Medication 975 MILLIGRAM(S): at 16:07

## 2023-07-25 RX ADMIN — Medication 600 MILLIGRAM(S): at 00:10

## 2023-07-25 RX ADMIN — Medication 600 MILLIGRAM(S): at 12:52

## 2023-07-25 RX ADMIN — Medication 975 MILLIGRAM(S): at 21:30

## 2023-07-25 RX ADMIN — Medication 975 MILLIGRAM(S): at 15:37

## 2023-07-25 RX ADMIN — Medication 975 MILLIGRAM(S): at 09:45

## 2023-07-25 RX ADMIN — Medication 600 MILLIGRAM(S): at 23:26

## 2023-07-25 RX ADMIN — Medication 600 MILLIGRAM(S): at 12:22

## 2023-07-25 RX ADMIN — Medication 975 MILLIGRAM(S): at 20:49

## 2023-07-25 RX ADMIN — Medication 600 MILLIGRAM(S): at 05:41

## 2023-07-25 RX ADMIN — Medication 600 MILLIGRAM(S): at 06:25

## 2023-07-25 RX ADMIN — Medication 600 MILLIGRAM(S): at 18:09

## 2023-07-25 RX ADMIN — Medication 1 TABLET(S): at 12:21

## 2023-07-25 RX ADMIN — Medication 600 MILLIGRAM(S): at 17:50

## 2023-07-25 RX ADMIN — TETANUS TOXOID, REDUCED DIPHTHERIA TOXOID AND ACELLULAR PERTUSSIS VACCINE, ADSORBED 0.5 MILLILITER(S): 5; 2.5; 8; 8; 2.5 SUSPENSION INTRAMUSCULAR at 22:15

## 2023-07-25 RX ADMIN — Medication 975 MILLIGRAM(S): at 09:15

## 2023-07-26 VITALS
HEART RATE: 84 BPM | TEMPERATURE: 98 F | OXYGEN SATURATION: 99 % | RESPIRATION RATE: 18 BRPM | DIASTOLIC BLOOD PRESSURE: 80 MMHG | SYSTOLIC BLOOD PRESSURE: 116 MMHG

## 2023-07-26 PROCEDURE — 86850 RBC ANTIBODY SCREEN: CPT

## 2023-07-26 PROCEDURE — 86901 BLOOD TYPING SEROLOGIC RH(D): CPT

## 2023-07-26 PROCEDURE — 86900 BLOOD TYPING SEROLOGIC ABO: CPT

## 2023-07-26 PROCEDURE — 84156 ASSAY OF PROTEIN URINE: CPT

## 2023-07-26 PROCEDURE — 85384 FIBRINOGEN ACTIVITY: CPT

## 2023-07-26 PROCEDURE — 83615 LACTATE (LD) (LDH) ENZYME: CPT

## 2023-07-26 PROCEDURE — 85025 COMPLETE CBC W/AUTO DIFF WBC: CPT

## 2023-07-26 PROCEDURE — 85730 THROMBOPLASTIN TIME PARTIAL: CPT

## 2023-07-26 PROCEDURE — 59050 FETAL MONITOR W/REPORT: CPT

## 2023-07-26 PROCEDURE — 36415 COLL VENOUS BLD VENIPUNCTURE: CPT

## 2023-07-26 PROCEDURE — 86780 TREPONEMA PALLIDUM: CPT

## 2023-07-26 PROCEDURE — 82570 ASSAY OF URINE CREATININE: CPT

## 2023-07-26 PROCEDURE — 84550 ASSAY OF BLOOD/URIC ACID: CPT

## 2023-07-26 PROCEDURE — 90715 TDAP VACCINE 7 YRS/> IM: CPT

## 2023-07-26 PROCEDURE — 85610 PROTHROMBIN TIME: CPT

## 2023-07-26 PROCEDURE — 80053 COMPREHEN METABOLIC PANEL: CPT

## 2023-07-26 PROCEDURE — 81001 URINALYSIS AUTO W/SCOPE: CPT

## 2023-07-26 RX ORDER — DIBUCAINE 1 %
1 OINTMENT (GRAM) RECTAL
Qty: 0 | Refills: 0 | DISCHARGE
Start: 2023-07-26

## 2023-07-26 RX ORDER — IBUPROFEN 200 MG
1 TABLET ORAL
Qty: 0 | Refills: 0 | DISCHARGE
Start: 2023-07-26

## 2023-07-26 RX ORDER — ACETAMINOPHEN 500 MG
3 TABLET ORAL
Qty: 0 | Refills: 0 | DISCHARGE
Start: 2023-07-26

## 2023-07-26 RX ADMIN — Medication 975 MILLIGRAM(S): at 03:00

## 2023-07-26 RX ADMIN — Medication 975 MILLIGRAM(S): at 09:52

## 2023-07-26 RX ADMIN — Medication 600 MILLIGRAM(S): at 00:00

## 2023-07-26 RX ADMIN — Medication 975 MILLIGRAM(S): at 02:32

## 2023-07-26 NOTE — PROGRESS NOTE ADULT - ASSESSMENT
Patient is PPD#1 from  meeting all postpartum milestones. Patient has been intermittently tachycardic, vital signs otherwise normal and patient is asymptomatic. Exam is unremarkable. Due to cardiovascular risk factors, plan to monitor vital signs and symptoms. If patient endorses any orthopnea, dyspnea, tachypnea, palpitations, or chest pain, or tachycardia does not resolve will plan further cardiopulmonary workup.     #Postpartum care  - Continue with po analgesia  - Increase ambulation  - Continue regular diet  - No labs    #gHTN  - Admission HELLP labs wnl, P:C 0.1  - BP in 130s/70s after delivery  - Continue to monitor BP    MARIELOS Diaz, PGY-1
Patient is PPD#2 from  meeting all postpartum milestones. Vital signs are stable and physical exam is unremarkable.    #Postpartum care  - Continue with po analgesia  - Increase ambulation  - Continue regular diet  - No labs    #gHTN  - Patient normotensive, BP: 123/74 (23 @ 05:25) (112/76 - 128/82)  - Continue to monitor BP    MARIELOS Diaz, PGY-1

## 2023-07-28 PROBLEM — J45.909 UNSPECIFIED ASTHMA, UNCOMPLICATED: Chronic | Status: ACTIVE | Noted: 2023-07-24

## 2023-08-04 ENCOUNTER — APPOINTMENT (OUTPATIENT)
Age: 38
End: 2023-08-04

## 2023-08-14 ENCOUNTER — APPOINTMENT (OUTPATIENT)
Age: 38
End: 2023-08-14

## 2023-08-24 ENCOUNTER — APPOINTMENT (OUTPATIENT)
Age: 38
End: 2023-08-24

## 2023-09-14 ENCOUNTER — APPOINTMENT (OUTPATIENT)
Age: 38
End: 2023-09-14

## 2024-06-19 NOTE — OB RN PATIENT PROFILE - NS TRANSFER DISPOSITION PATIENT BELONGINGS
From: Hermelindo Luu  To: Dr. Hermelindo Mart  Sent: 6/18/2024 5:01 PM EDT  Subject: Xyzal    [facility] is trying to refill my Xyzal prescription. They sent me a message that they need reauthorization to do this. Could Dr. Mart send a new prescription to [facility] for a 90-day supply of Xyzal, please.    Thanks,    Hermelindo Luu   with patient
